# Patient Record
Sex: FEMALE | Race: WHITE | NOT HISPANIC OR LATINO | Employment: FULL TIME | RURAL
[De-identification: names, ages, dates, MRNs, and addresses within clinical notes are randomized per-mention and may not be internally consistent; named-entity substitution may affect disease eponyms.]

---

## 2020-01-17 ENCOUNTER — OFFICE VISIT (OUTPATIENT)
Dept: FAMILY MEDICINE CLINIC | Facility: CLINIC | Age: 30
End: 2020-01-17

## 2020-01-17 ENCOUNTER — APPOINTMENT (OUTPATIENT)
Dept: LAB | Facility: HOSPITAL | Age: 30
End: 2020-01-17

## 2020-01-17 VITALS
OXYGEN SATURATION: 96 % | DIASTOLIC BLOOD PRESSURE: 90 MMHG | HEART RATE: 64 BPM | TEMPERATURE: 98.2 F | BODY MASS INDEX: 37.42 KG/M2 | SYSTOLIC BLOOD PRESSURE: 128 MMHG | HEIGHT: 65 IN | WEIGHT: 224.6 LBS | RESPIRATION RATE: 20 BRPM

## 2020-01-17 DIAGNOSIS — Z13.29 SCREENING FOR THYROID DISORDER: ICD-10-CM

## 2020-01-17 DIAGNOSIS — Z00.00 WELL ADULT EXAM: Primary | ICD-10-CM

## 2020-01-17 DIAGNOSIS — Z13.1 SCREENING FOR DIABETES MELLITUS: ICD-10-CM

## 2020-01-17 DIAGNOSIS — E03.9 ACQUIRED HYPOTHYROIDISM: ICD-10-CM

## 2020-01-17 DIAGNOSIS — N92.0 MENORRHAGIA WITH REGULAR CYCLE: ICD-10-CM

## 2020-01-17 DIAGNOSIS — I10 ESSENTIAL HYPERTENSION: ICD-10-CM

## 2020-01-17 PROCEDURE — 82570 ASSAY OF URINE CREATININE: CPT | Performed by: NURSE PRACTITIONER

## 2020-01-17 PROCEDURE — 84481 FREE ASSAY (FT-3): CPT | Performed by: NURSE PRACTITIONER

## 2020-01-17 PROCEDURE — 99203 OFFICE O/P NEW LOW 30 MIN: CPT | Performed by: NURSE PRACTITIONER

## 2020-01-17 PROCEDURE — 82306 VITAMIN D 25 HYDROXY: CPT | Performed by: NURSE PRACTITIONER

## 2020-01-17 PROCEDURE — 83036 HEMOGLOBIN GLYCOSYLATED A1C: CPT | Performed by: NURSE PRACTITIONER

## 2020-01-17 PROCEDURE — 80050 GENERAL HEALTH PANEL: CPT | Performed by: NURSE PRACTITIONER

## 2020-01-17 PROCEDURE — 82043 UR ALBUMIN QUANTITATIVE: CPT | Performed by: NURSE PRACTITIONER

## 2020-01-17 PROCEDURE — 84439 ASSAY OF FREE THYROXINE: CPT | Performed by: NURSE PRACTITIONER

## 2020-01-17 RX ORDER — LISINOPRIL 5 MG/1
5 TABLET ORAL DAILY
Qty: 90 TABLET | Refills: 1 | Status: SHIPPED | OUTPATIENT
Start: 2020-01-17 | End: 2020-01-29 | Stop reason: SINTOL

## 2020-01-18 LAB
25(OH)D3 SERPL-MCNC: 18.7 NG/ML (ref 30–100)
ALBUMIN SERPL-MCNC: 4.5 G/DL (ref 3.5–5.2)
ALBUMIN UR-MCNC: <1.2 MG/DL
ALBUMIN/GLOB SERPL: 1.6 G/DL
ALP SERPL-CCNC: 57 U/L (ref 39–117)
ALT SERPL W P-5'-P-CCNC: 17 U/L (ref 1–33)
ANION GAP SERPL CALCULATED.3IONS-SCNC: 12.6 MMOL/L (ref 5–15)
AST SERPL-CCNC: 16 U/L (ref 1–32)
BASOPHILS # BLD AUTO: 0.06 10*3/MM3 (ref 0–0.2)
BASOPHILS NFR BLD AUTO: 0.7 % (ref 0–1.5)
BILIRUB SERPL-MCNC: 0.2 MG/DL (ref 0.2–1.2)
BUN BLD-MCNC: 12 MG/DL (ref 6–20)
BUN/CREAT SERPL: 17.9 (ref 7–25)
CALCIUM SPEC-SCNC: 10 MG/DL (ref 8.6–10.5)
CHLORIDE SERPL-SCNC: 98 MMOL/L (ref 98–107)
CO2 SERPL-SCNC: 26.4 MMOL/L (ref 22–29)
CREAT BLD-MCNC: 0.67 MG/DL (ref 0.57–1)
CREAT UR-MCNC: 34.1 MG/DL
DEPRECATED RDW RBC AUTO: 37.9 FL (ref 37–54)
EOSINOPHIL # BLD AUTO: 0.09 10*3/MM3 (ref 0–0.4)
EOSINOPHIL NFR BLD AUTO: 1 % (ref 0.3–6.2)
ERYTHROCYTE [DISTWIDTH] IN BLOOD BY AUTOMATED COUNT: 12.5 % (ref 12.3–15.4)
GFR SERPL CREATININE-BSD FRML MDRD: 104 ML/MIN/1.73
GLOBULIN UR ELPH-MCNC: 2.9 GM/DL
GLUCOSE BLD-MCNC: 85 MG/DL (ref 65–99)
HBA1C MFR BLD: 5.03 % (ref 4.8–5.6)
HCT VFR BLD AUTO: 41.1 % (ref 34–46.6)
HGB BLD-MCNC: 14.4 G/DL (ref 12–15.9)
IMM GRANULOCYTES # BLD AUTO: 0.02 10*3/MM3 (ref 0–0.05)
IMM GRANULOCYTES NFR BLD AUTO: 0.2 % (ref 0–0.5)
LYMPHOCYTES # BLD AUTO: 2.48 10*3/MM3 (ref 0.7–3.1)
LYMPHOCYTES NFR BLD AUTO: 28.5 % (ref 19.6–45.3)
MCH RBC QN AUTO: 29.1 PG (ref 26.6–33)
MCHC RBC AUTO-ENTMCNC: 35 G/DL (ref 31.5–35.7)
MCV RBC AUTO: 83.2 FL (ref 79–97)
MICROALBUMIN/CREAT UR: NORMAL MG/G{CREAT}
MONOCYTES # BLD AUTO: 0.44 10*3/MM3 (ref 0.1–0.9)
MONOCYTES NFR BLD AUTO: 5.1 % (ref 5–12)
NEUTROPHILS # BLD AUTO: 5.61 10*3/MM3 (ref 1.7–7)
NEUTROPHILS NFR BLD AUTO: 64.5 % (ref 42.7–76)
NRBC BLD AUTO-RTO: 0 /100 WBC (ref 0–0.2)
PLATELET # BLD AUTO: 260 10*3/MM3 (ref 140–450)
PMV BLD AUTO: 11.3 FL (ref 6–12)
POTASSIUM BLD-SCNC: 4.4 MMOL/L (ref 3.5–5.2)
PROT SERPL-MCNC: 7.4 G/DL (ref 6–8.5)
RBC # BLD AUTO: 4.94 10*6/MM3 (ref 3.77–5.28)
SODIUM BLD-SCNC: 137 MMOL/L (ref 136–145)
T3FREE SERPL-MCNC: 2.77 PG/ML (ref 2–4.4)
T4 FREE SERPL-MCNC: 1.03 NG/DL (ref 0.93–1.7)
TSH SERPL DL<=0.05 MIU/L-ACNC: 2.06 UIU/ML (ref 0.27–4.2)
WBC NRBC COR # BLD: 8.7 10*3/MM3 (ref 3.4–10.8)

## 2020-01-27 ENCOUNTER — TELEPHONE (OUTPATIENT)
Dept: FAMILY MEDICINE CLINIC | Facility: CLINIC | Age: 30
End: 2020-01-27

## 2020-01-27 NOTE — TELEPHONE ENCOUNTER
----- Message from LAWANDA Pierce sent at 1/22/2020  1:18 PM CST -----  Vitamin D is very low.  Recommend OTC vitamin D3 supplement once daily.  Otherwise normal labs.

## 2020-01-29 ENCOUNTER — OFFICE VISIT (OUTPATIENT)
Dept: FAMILY MEDICINE CLINIC | Facility: CLINIC | Age: 30
End: 2020-01-29

## 2020-01-29 VITALS
SYSTOLIC BLOOD PRESSURE: 116 MMHG | HEIGHT: 66 IN | BODY MASS INDEX: 35.86 KG/M2 | TEMPERATURE: 98.4 F | OXYGEN SATURATION: 98 % | RESPIRATION RATE: 20 BRPM | DIASTOLIC BLOOD PRESSURE: 78 MMHG | WEIGHT: 223.13 LBS | HEART RATE: 75 BPM

## 2020-01-29 DIAGNOSIS — H11.32 SUBCONJUNCTIVAL HEMORRHAGE OF LEFT EYE: ICD-10-CM

## 2020-01-29 DIAGNOSIS — R05.8 COUGH DUE TO ACE INHIBITOR: Primary | ICD-10-CM

## 2020-01-29 DIAGNOSIS — T46.4X5A COUGH DUE TO ACE INHIBITOR: Primary | ICD-10-CM

## 2020-01-29 DIAGNOSIS — Z20.828 EXPOSURE TO INFLUENZA: ICD-10-CM

## 2020-01-29 LAB
EXPIRATION DATE: NORMAL
FLUAV AG NPH QL: NEGATIVE
FLUBV AG NPH QL: NEGATIVE
INTERNAL CONTROL: NORMAL
Lab: NORMAL

## 2020-01-29 PROCEDURE — 99214 OFFICE O/P EST MOD 30 MIN: CPT | Performed by: NURSE PRACTITIONER

## 2020-01-29 PROCEDURE — 87804 INFLUENZA ASSAY W/OPTIC: CPT | Performed by: NURSE PRACTITIONER

## 2020-01-29 NOTE — PROGRESS NOTES
"Subjective   Kecia Julien is a 29 y.o. female.     [facility] Visit    PCP: LAWANDA Tatum    CC: \"persistent cough, headache from cough\"    Recently started on Lisinopril on 1- for HTN.  Had previously been on beta blocker and b/p was running too low.  Currently on Lisinopril 5 mg daily.  Cough started about a week after she started taking medication.     Living at [facility] and one of the ladies tested + for influenza this week.      Cough   This is a new problem. Episode onset: x 4-5 days. The problem has been unchanged. The problem occurs every few minutes. The cough is non-productive. Associated symptoms include eye redness ( right) and headaches (only with cough). Pertinent negatives include no chest pain, chills, ear congestion, ear pain, fever, heartburn, hemoptysis, myalgias, nasal congestion, postnasal drip, rash, rhinorrhea, sore throat, shortness of breath, sweats, weight loss or wheezing. Nothing (started after taking Lisinopril x 1 week) aggravates the symptoms. Risk factors: recent exposure to Influenza. She has tried nothing for the symptoms.        The following portions of the patient's history were reviewed and updated as appropriate: allergies, current medications, past medical history, past social history, past surgical history and problem list.    Review of Systems   Constitutional: Negative.  Negative for chills, fever and unexpected weight loss.   HENT: Negative for ear pain, postnasal drip, rhinorrhea and sore throat.    Eyes: Positive for redness ( right).   Respiratory: Positive for cough. Negative for hemoptysis, chest tightness, shortness of breath and wheezing.    Cardiovascular: Negative for chest pain.   Gastrointestinal: Negative for diarrhea and nausea.   Genitourinary: Negative for difficulty urinating.   Musculoskeletal: Negative for myalgias.   Skin: Negative for rash.   Neurological: Positive for headache (with cough only). Negative for dizziness.     /78 " "(BP Location: Left arm, Patient Position: Sitting, Cuff Size: Adult)   Pulse 75   Temp 98.4 °F (36.9 °C) (Oral)   Resp 20   Ht 167.6 cm (66\")   Wt 101 kg (223 lb 2 oz)   LMP 01/03/2020   SpO2 98%   Breastfeeding No   BMI 36.01 kg/m²     Objective   Physical Exam   Constitutional: She is oriented to person, place, and time. She appears well-developed and well-nourished. No distress.   HENT:   Head: Normocephalic and atraumatic.   Right Ear: Tympanic membrane and ear canal normal.   Left Ear: Tympanic membrane and ear canal normal.   Nose: Nose normal. Right sinus exhibits no maxillary sinus tenderness and no frontal sinus tenderness. Left sinus exhibits no maxillary sinus tenderness and no frontal sinus tenderness.   Mouth/Throat: Uvula is midline, oropharynx is clear and moist and mucous membranes are normal.   Eyes: Right eye exhibits no discharge. Left eye exhibits no discharge. Right conjunctiva has a hemorrhage ( small, upper medial aspect). Left conjunctiva has no hemorrhage.   Neck: Neck supple.   Cardiovascular: Normal rate and regular rhythm.   Pulmonary/Chest: Effort normal and breath sounds normal. She has no wheezes. She has no rales.   Frequent, hacky cough   Lymphadenopathy:     She has no cervical adenopathy.   Neurological: She is alert and oriented to person, place, and time.   Nursing note and vitals reviewed.    Recent Results (from the past 24 hour(s))   POC Influenza A / B    Collection Time: 01/29/20  2:21 PM   Result Value Ref Range    Rapid Influenza A Ag Negative Negative    Rapid Influenza B Ag Negative Negative    Internal Control Passed Passed    Lot Number [id number]     Expiration Date 03-      No Images in the past 120 days found..      Assessment/Plan   Kecia was seen today for cough.    Diagnoses and all orders for this visit:    Cough due to ACE inhibitor  -     POC Influenza A / B    Exposure to influenza  -     POC Influenza A / B    Subconjunctival hemorrhage of " left eye  Comments:  from persistent cough      Suspect cough from recent initiation of Ace inhibitor--will list as allergy and discontinue  Wishes to hold off on further treatment of HTN at this time.  Will monitor at home for the next week or two and notify us if she is seeing an elevation and then we can start Losartan 25 mg daily.    See PCP for routine f/u and management of HTN    Reassurance that subconjunctival hemorrhage should resolve on its own.      Patient's Body mass index is 36.01 kg/m². BMI is above normal parameters. Recommendations include: referral to primary care.    See PCP or RTC if symptoms persist/worsen  See PCP for routine f/u visit and management of chronic medical conditions      This document has been electronically signed by LAWANDA Bazan on January 29, 2020 2:23 PM,.

## 2020-02-10 ENCOUNTER — OFFICE VISIT (OUTPATIENT)
Dept: OBSTETRICS AND GYNECOLOGY | Facility: CLINIC | Age: 30
End: 2020-02-10

## 2020-02-10 VITALS
WEIGHT: 224 LBS | HEIGHT: 66 IN | SYSTOLIC BLOOD PRESSURE: 116 MMHG | DIASTOLIC BLOOD PRESSURE: 80 MMHG | BODY MASS INDEX: 36 KG/M2

## 2020-02-10 DIAGNOSIS — N92.0 MENORRHAGIA WITH REGULAR CYCLE: Primary | ICD-10-CM

## 2020-02-10 PROCEDURE — 99212 OFFICE O/P EST SF 10 MIN: CPT | Performed by: NURSE PRACTITIONER

## 2020-02-10 NOTE — PROGRESS NOTES
Subjective   Kecia Julien is a 29 y.o. here for heavy menses    LMP: 2/7/2020  [facility], per patient. Signed release.  BC: Tubal ligation, 2016      Pt desires treatment for her heavy menstrual cycles. She reports her periods occur monthly but last for two weeks at a time with a heavy flow. She is not interested in hormonal therapy. She had a Mirena in 2015 and it did not help with her period. She also became pregnant with the Mirena which resulted in a miscarriage. Pt also has a hx of high blood pressure; not currently taking meds. Recently discontinued lisinopril.     Menstrual Problem   This is a chronic problem. The current episode started more than 1 year ago. The problem occurs every several days. The problem has been unchanged. Pertinent negatives include no abdominal pain, chest pain, chills, fatigue, fever, nausea, rash, sore throat, vomiting or weakness. Nothing aggravates the symptoms. She has tried nothing for the symptoms.       The following portions of the patient's history were reviewed and updated as appropriate: allergies, current medications, past family history, past medical history, past social history, past surgical history and problem list.    Review of Systems   Constitutional: Negative for chills, fatigue, fever, unexpected weight gain and unexpected weight loss.   HENT: Negative for sneezing and sore throat.    Respiratory: Negative for shortness of breath.    Cardiovascular: Negative for chest pain and palpitations.   Gastrointestinal: Negative for abdominal pain, constipation, diarrhea, nausea and vomiting.   Endocrine: Negative for cold intolerance and heat intolerance.   Genitourinary: Positive for menstrual problem. Negative for amenorrhea, breast discharge, breast lump, breast pain, difficulty urinating, dysuria, frequency, pelvic pain, pelvic pressure, urinary incontinence, vaginal bleeding, vaginal discharge and vaginal pain.   Skin: Negative for  rash.   Neurological: Negative for weakness and headache.   Psychiatric/Behavioral: Negative for sleep disturbance, depressed mood and stress.       Objective   Physical Exam   Constitutional: She is oriented to person, place, and time. She appears well-developed and well-nourished.   HENT:   Head: Normocephalic.   Neck: Normal range of motion.   Pulmonary/Chest: Effort normal.   Abdominal: Soft.   Musculoskeletal: Normal range of motion.   Neurological: She is alert and oriented to person, place, and time.   Skin: Skin is warm and dry.   Psychiatric: She has a normal mood and affect. Her behavior is normal.   Nursing note and vitals reviewed.        Assessment/Plan   Diagnoses and all orders for this visit:    Menorrhagia with regular cycle      Counseled on hormonal options to manage heavy menses to include OCPs & Depo-provera; pt is not interested. Discussed possible endometrial ablation; [employer] handout reviewed and given to patient. Pt to schedule an apt with Dr. Bowles for a consult.

## 2020-02-11 DIAGNOSIS — N92.0 MENORRHAGIA WITH REGULAR CYCLE: Primary | ICD-10-CM

## 2020-02-12 ENCOUNTER — TELEPHONE (OUTPATIENT)
Dept: OBSTETRICS AND GYNECOLOGY | Facility: CLINIC | Age: 30
End: 2020-02-12

## 2020-02-12 NOTE — TELEPHONE ENCOUNTER
----- Message from LAWANDA Sheikh sent at 2/11/2020  4:36 PM CST -----  Can we call patient and have her do an U/S before she meets with Dr. Bowles to rule out fibroids before she can proceeds with an endometrial ablation? I have already place an order. Per Dr. Bowles, she will also need an EMB in which Dr. Bowles can do it when he sees her.

## 2020-02-12 NOTE — TELEPHONE ENCOUNTER
"Attempted to call patient at this time message stated \"This is Sheila\"  " Detail Level: Detailed Post-Care Instructions: I reviewed with the patient in detail post-care instructions. Patient is to wear sunprotection, and avoid picking at any of the treated lesions. Pt may apply Vaseline to crusted or scabbing areas. Medical Necessity Information: It is in your best interest to select a reason for this procedure from the list below. All of these items fulfill various CMS LCD requirements except the new and changing color options. Medical Necessity Clause: This procedure was medically necessary because the lesions that were treated were: bleeding and irritated Include Z78.9 (Other Specified Conditions Influencing Health Status) As An Associated Diagnosis?: Yes Consent: The patient's consent was obtained including but not limited to risks of crusting, scabbing, blistering, scarring, darker or lighter pigmentary change, recurrence, incomplete removal and infection. Duration Of Freeze Thaw-Cycle (Seconds): 0 Number Of Freeze-Thaw Cycles: 2 freeze-thaw cycles Add 52 Modifier (Optional): no

## 2020-02-17 ENCOUNTER — TELEPHONE (OUTPATIENT)
Dept: OBSTETRICS AND GYNECOLOGY | Facility: CLINIC | Age: 30
End: 2020-02-17

## 2020-02-17 NOTE — TELEPHONE ENCOUNTER
Attempted to get in touch with patient again at this time patient was not available left message for patient to call back

## 2020-02-20 ENCOUNTER — OFFICE VISIT (OUTPATIENT)
Dept: OBSTETRICS AND GYNECOLOGY | Facility: CLINIC | Age: 30
End: 2020-02-20

## 2020-02-20 VITALS
DIASTOLIC BLOOD PRESSURE: 70 MMHG | SYSTOLIC BLOOD PRESSURE: 119 MMHG | WEIGHT: 226 LBS | HEIGHT: 66 IN | BODY MASS INDEX: 36.32 KG/M2

## 2020-02-20 DIAGNOSIS — N92.0 MENORRHAGIA WITH REGULAR CYCLE: Primary | ICD-10-CM

## 2020-02-20 PROCEDURE — 99213 OFFICE O/P EST LOW 20 MIN: CPT | Performed by: OBSTETRICS & GYNECOLOGY

## 2020-02-20 NOTE — PROGRESS NOTES
Kecia Julien is a 29 y.o. y/o female.     Chief Complaint: Heavy periods    HPI:   29 y.o. .  Patient's last menstrual period was 2020..  Patient presents for follow-up with me today for heavy periods.  Was seen by Liset last week for evaluation and was sent to me for counseling regarding endometrial ablation.  Patient states that she has had very heavy periods that last for several days to weeks at this time.  Has had a Mirena in the past which did not help her periods at all, she is now looking for more definitive management of her bleeding as it is becoming life altering.  Patient has had 3 previous C-sections, I explained to her that given the C-sections she may be at slightly increased risk of damage during the ablation is that portion of the uterus has thinned and can lead to perforation.  Patient is going to get a pelvic ultrasound to see if there is any other further causes of her bleeding that may be able to be found.  After ultrasound is done we will review results and will need to do an endometrial biopsy based on these findings will discuss with patient if hysterectomy versus ablation may be a better option for her.  I spent 50% of this appointment discussing treatment options risks and benefits with this patient.     Review of Systems   Constitutional: Negative for chills, fatigue and fever.   HENT: Negative for sore throat.    Eyes: Negative for visual disturbance.   Respiratory: Negative for cough, shortness of breath and wheezing.    Cardiovascular: Negative for chest pain, palpitations and leg swelling.   Gastrointestinal: Negative for abdominal pain, diarrhea, nausea and vomiting.   Genitourinary: Positive for menstrual problem and vaginal bleeding. Negative for dysuria, flank pain, frequency, vaginal discharge and vaginal pain.   Neurological: Negative for syncope, light-headedness and headaches.   Psychiatric/Behavioral: Negative for dysphoric mood and suicidal ideas. The patient is  not nervous/anxious.         The following portions of the patient's history were reviewed and updated as appropriate: allergies, current medications, past family history, past medical history, past social history, past surgical history and problem list.    Allergies   Allergen Reactions   • Lisinopril Cough        Prior to Admission medications    Not on File        The patient has a family history of   Family History   Problem Relation Age of Onset   • Hypertension Mother    • Diabetes Father    • Cancer Maternal Aunt    • Ovarian cancer Maternal Aunt    • Hypertension Maternal Aunt    • Atrial fibrillation Maternal Grandfather    • Hypertension Maternal Grandfather    • Cancer Paternal Grandmother    • Stroke Maternal Grandmother    • Stroke Maternal Aunt    • Hypertension Maternal Aunt    • Diabetes Paternal Grandfather         Past Medical History:   Diagnosis Date   • Hypertension    • Hypothyroid 2016   • Hypothyroidism    • Kidney stone 2016        OB History        4    Para   3    Term   3            AB   1    Living   3       SAB   1    TAB   0    Ectopic   0    Molar   0    Multiple   0    Live Births   3                 Social History     Socioeconomic History   • Marital status:      Spouse name: Not on file   • Number of children: Not on file   • Years of education: Not on file   • Highest education level: Not on file   Tobacco Use   • Smoking status: Former Smoker     Packs/day: 1.00     Years: 10.00     Pack years: 10.00     Types: Cigarettes     Start date: 2009     Last attempt to quit: 2019     Years since quittin.1   • Smokeless tobacco: Never Used   Substance and Sexual Activity   • Alcohol use: Not Currently     Frequency: Never   • Drug use: Not Currently     Types: Methamphetamines   • Sexual activity: Not Currently     Partners: Male     Birth control/protection: Surgical        Past Surgical History:   Procedure Laterality Date   •   "SECTION     •  SECTION WITH TUBAL  2016   • CHOLECYSTECTOMY     • D&C WITH SUCTION  2015   • LAPAROSCOPIC CHOLECYSTECTOMY  2016   • TONSILLECTOMY     • TUBAL ABDOMINAL LIGATION          Patient Active Problem List   Diagnosis   • Cough due to ACE inhibitor        Documented Vitals    20 1120   BP: 119/70   Weight: 103 kg (226 lb)   Height: 167.6 cm (66\")   PainSc: 0-No pain        Body mass index is 36.48 kg/m².    Physical Exam   Constitutional: She is oriented to person, place, and time. She appears well-developed and well-nourished. No distress.   Musculoskeletal: Normal range of motion.   Neurological: She is alert and oriented to person, place, and time.   Skin: Skin is warm and dry. She is not diaphoretic.   Psychiatric: She has a normal mood and affect. Her behavior is normal. Judgment and thought content normal.   Nursing note and vitals reviewed.      Laboratory Data:   Lab Results - Last 18 Months   Lab Units 20  1512   GLUCOSE mg/dL 85   BUN mg/dL 12   CREATININE mg/dL 0.67   SODIUM mmol/L 137   POTASSIUM mmol/L 4.4   CHLORIDE mmol/L 98   CO2 mmol/L 26.4   CALCIUM mg/dL 10.0   TOTAL PROTEIN g/dL 7.4   ALBUMIN g/dL 4.50   ALT (SGPT) U/L 17   AST (SGOT) U/L 16   ALK PHOS U/L 57   BILIRUBIN mg/dL 0.2   EGFR IF NONAFRICN AM mL/min/1.73 104   GLOBULIN gm/dL 2.9   A/G RATIO g/dL 1.6   BUN / CREAT RATIO  17.9   ANION GAP mmol/L 12.6     Lab Results - Last 18 Months   Lab Units 20  1512   WBC 10*3/mm3 8.70   RBC 10*6/mm3 4.94   HEMOGLOBIN g/dL 14.4   HEMATOCRIT % 41.1   MCV fL 83.2   MCH pg 29.1   MCHC g/dL 35.0   RDW % 12.5   RDW-SD fl 37.9   MPV fL 11.3   PLATELETS 10*3/mm3 260     No results for input(s): HCGQUAL in the last 08027 hours.    Assessment   Patient with abnormal uterine bleeding that is refractory to medical management having failed IUD.  Now desiring definitive treatment with surgical treatment.  Plan for patient to get pelvic ultrasound to determine if " there are any anatomic problems that be could be causing bleeding and may make her not a candidate for ablation.  Given patient's history of 3 previous  will decide if she is a candidate for ablation or not.  Patient to get pelvic ultrasound and follow-up with me in approximately 2 weeks.  Patient did not feel comfortable with endometrial biopsy today we will plan to do that in 2 weeks when she returns to see me.  Patient to return sooner as needed.     Diagnosis Plan   1. Menorrhagia with regular cycle           This document has been electronically signed by Vincenzo Bowles DO on 2020 11:38 AM

## 2020-02-26 ENCOUNTER — OFFICE VISIT (OUTPATIENT)
Dept: FAMILY MEDICINE CLINIC | Facility: CLINIC | Age: 30
End: 2020-02-26

## 2020-02-26 VITALS
HEIGHT: 66 IN | BODY MASS INDEX: 36.53 KG/M2 | SYSTOLIC BLOOD PRESSURE: 120 MMHG | HEART RATE: 82 BPM | RESPIRATION RATE: 20 BRPM | WEIGHT: 227.3 LBS | DIASTOLIC BLOOD PRESSURE: 60 MMHG | OXYGEN SATURATION: 98 % | TEMPERATURE: 98.7 F

## 2020-02-26 DIAGNOSIS — F43.0 STRESS REACTION: ICD-10-CM

## 2020-02-26 DIAGNOSIS — G47.09 OTHER INSOMNIA: Primary | ICD-10-CM

## 2020-02-26 PROCEDURE — 99214 OFFICE O/P EST MOD 30 MIN: CPT | Performed by: NURSE PRACTITIONER

## 2020-02-26 RX ORDER — AMITRIPTYLINE HYDROCHLORIDE 10 MG/1
10-20 TABLET, FILM COATED ORAL NIGHTLY PRN
Qty: 60 TABLET | Refills: 1 | Status: SHIPPED | OUTPATIENT
Start: 2020-02-26 | End: 2020-04-08 | Stop reason: DRUGHIGH

## 2020-02-26 NOTE — PROGRESS NOTES
Subjective   Kecia Julien is a 29 y.o. female.     She presents today to [facility] care.  She is a resident at [facility].  She reports a medical history significant for hypertension and thyroid disorder.  She reports that she has not been on thyroid medication in the last year.  She would like to have her levels checked today in the office.  She is due for routine labs.  Her blood pressure is slightly elevated today in the office.  She is otherwise without any other new complaints.  She reports that she is adapting well at [facility] and enjoying the program.    Hypertension   This is a chronic problem. The current episode started more than 1 year ago. The problem has been waxing and waning since onset. The problem is controlled. Associated symptoms include malaise/fatigue. Pertinent negatives include no anxiety, blurred vision, chest pain, headaches, neck pain, orthopnea, palpitations, peripheral edema, PND, shortness of breath or sweats. There are no associated agents to hypertension. Risk factors for coronary artery disease include family history, obesity and stress. Current antihypertension treatment includes nothing. The current treatment provides moderate improvement. Compliance problems include diet and exercise.  Identifiable causes of hypertension include a thyroid problem.   Thyroid Problem   Presents for initial visit. Symptoms include fatigue and menstrual problem. Patient reports no anxiety, cold intolerance, constipation, depressed mood, diaphoresis, diarrhea, dry skin, hair loss, heat intolerance, hoarse voice, leg swelling, nail problem, palpitations, tremors, visual change, weight gain or weight loss. The symptoms have been stable. Past treatments include nothing. Her past medical history is significant for obesity.        The following portions of the patient's history were reviewed and updated as appropriate: allergies, current medications, past family history, past medical history,  past social history, past surgical history and problem list.    Review of Systems   Constitutional: Positive for fatigue and malaise/fatigue. Negative for diaphoresis, unexpected weight gain and unexpected weight loss.   HENT: Negative.  Negative for hoarse voice.    Eyes: Negative.  Negative for blurred vision.   Respiratory: Negative.  Negative for shortness of breath.    Cardiovascular: Negative.  Negative for chest pain, palpitations, orthopnea and PND.   Gastrointestinal: Negative.  Negative for constipation and diarrhea.   Endocrine: Negative for cold intolerance and heat intolerance.   Genitourinary: Positive for menstrual problem.   Musculoskeletal: Negative.  Negative for neck pain.   Skin: Negative.  Negative for dry skin.   Allergic/Immunologic: Negative.    Neurological: Negative.  Negative for tremors.   Hematological: Negative.    Psychiatric/Behavioral: Negative.  Negative for depressed mood. The patient is not nervous/anxious.        Objective   Physical Exam   Constitutional: She is oriented to person, place, and time. Vital signs are normal. She appears well-developed and well-nourished. No distress. She is obese.  HENT:   Head: Normocephalic.   Right Ear: External ear normal.   Left Ear: External ear normal.   Nose: Nose normal.   Mouth/Throat: Oropharynx is clear and moist. No oropharyngeal exudate.   Eyes: Pupils are equal, round, and reactive to light. Conjunctivae and EOM are normal. Right eye exhibits no discharge. Left eye exhibits no discharge.   Neck: Normal range of motion. Neck supple. No tracheal deviation present. No thyromegaly present.   Cardiovascular: Normal rate, regular rhythm and normal heart sounds. Exam reveals no gallop and no friction rub.   No murmur heard.  Pulmonary/Chest: Effort normal and breath sounds normal. No respiratory distress. She has no wheezes. She has no rales. She exhibits no tenderness.   Musculoskeletal: Normal range of motion.   Lymphadenopathy:     She  has no cervical adenopathy.   Neurological: She is alert and oriented to person, place, and time.   Skin: Skin is warm and dry. Capillary refill takes less than 2 seconds. No rash noted. She is not diaphoretic. No erythema. No pallor.   Psychiatric: She has a normal mood and affect. Her behavior is normal. Judgment and thought content normal.   Nursing note and vitals reviewed.        Assessment/Plan   Kecia was seen today for establish care and hypertension.    Diagnoses and all orders for this visit:    Well adult exam  -     CBC & Differential  -     Comprehensive Metabolic Panel  -     Hemoglobin A1c  -     TSH  -     Vitamin D 25 Hydroxy  -     Microalbumin / Creatinine Urine Ratio - Urine, Clean Catch  -     T3, Free  -     T4, Free  -     CBC Auto Differential    Screening for diabetes mellitus  -     CBC & Differential  -     Comprehensive Metabolic Panel  -     Hemoglobin A1c  -     TSH  -     Vitamin D 25 Hydroxy  -     Microalbumin / Creatinine Urine Ratio - Urine, Clean Catch  -     T3, Free  -     T4, Free  -     CBC Auto Differential    Screening for thyroid disorder  -     CBC & Differential  -     Comprehensive Metabolic Panel  -     Hemoglobin A1c  -     TSH  -     Vitamin D 25 Hydroxy  -     Microalbumin / Creatinine Urine Ratio - Urine, Clean Catch  -     T3, Free  -     T4, Free  -     CBC Auto Differential    Acquired hypothyroidism  -     CBC & Differential  -     Comprehensive Metabolic Panel  -     Hemoglobin A1c  -     TSH  -     Vitamin D 25 Hydroxy  -     Microalbumin / Creatinine Urine Ratio - Urine, Clean Catch  -     T3, Free  -     T4, Free  -     CBC Auto Differential    Essential hypertension  -     CBC & Differential  -     Comprehensive Metabolic Panel  -     Hemoglobin A1c  -     TSH  -     Vitamin D 25 Hydroxy  -     Microalbumin / Creatinine Urine Ratio - Urine, Clean Catch  -     T3, Free  -     T4, Free  -     Discontinue: lisinopril (PRINIVIL,ZESTRIL) 5 MG tablet; Take 1  tablet by mouth Daily.  -     CBC Auto Differential    Menorrhagia with regular cycle  -     Ambulatory Referral to Obstetrics / Gynecology               Patient's Body mass index is 37.38 kg/m². BMI is above normal parameters. Recommendations include: educational material.    Lab following office visit.  Referral to GYN.  Continue current medications.  Follow up in 6 weeks for routine follow up.  Follow up sooner for problems/concerns.  Patient verbalized understanding and agreement with plan of care.        This document has been electronically signed by LAWANDA Pierce on February 26, 2020 8:52 AM

## 2020-03-17 NOTE — PROGRESS NOTES
Subjective   Kecia Julien is a 29 y.o. female.     She presents today for her routine follow-up on chronic medical problems.  Her blood pressure is normal today in the office.  She reports that she developed a cough after starting the lisinopril.  She has discontinued this.  She also was surprised to find out that her thyroid levels were normal.  She does have concerns with anxiety and stress symptoms.  She also has concerns with insomnia.  She is interested in something to help her rest at night if possible.  She is otherwise without any other new complaints today in the office.    Hypertension   This is a chronic problem. The current episode started more than 1 year ago. The problem has been waxing and waning since onset. The problem is controlled. Pertinent negatives include no anxiety, blurred vision, chest pain, headaches, neck pain, orthopnea, palpitations, peripheral edema, PND, shortness of breath or sweats. There are no associated agents to hypertension. Risk factors for coronary artery disease include family history, obesity and stress. Current antihypertension treatment includes nothing. The current treatment provides moderate improvement. Compliance problems include diet and exercise.  Identifiable causes of hypertension include a thyroid problem.   Insomnia   This is a new problem. The current episode started more than 1 month ago. The problem occurs intermittently. The problem has been gradually worsening. Associated symptoms include fatigue. Pertinent negatives include no abdominal pain, anorexia, arthralgias, change in bowel habit, chest pain, chills, congestion, coughing, diaphoresis, fever, headaches, joint swelling, myalgias, nausea, neck pain, numbness, rash, sore throat, swollen glands, urinary symptoms, vertigo, visual change, vomiting or weakness. The treatment provided no relief.        The following portions of the patient's history were reviewed and updated as appropriate: allergies,  current medications, past family history, past medical history, past social history, past surgical history and problem list.    Review of Systems   Constitutional: Positive for fatigue. Negative for chills, diaphoresis, fever, unexpected weight gain and unexpected weight loss.   HENT: Negative.  Negative for congestion, hoarse voice, sore throat and swollen glands.    Eyes: Negative.  Negative for blurred vision.   Respiratory: Negative.  Negative for cough and shortness of breath.    Cardiovascular: Negative.  Negative for chest pain, palpitations, orthopnea and PND.   Gastrointestinal: Negative.  Negative for abdominal pain, anorexia, change in bowel habit, constipation, diarrhea, nausea and vomiting.   Endocrine: Negative for cold intolerance and heat intolerance.   Genitourinary: Positive for menstrual problem.   Musculoskeletal: Negative.  Negative for arthralgias, joint swelling, myalgias and neck pain.   Skin: Negative.  Negative for rash.   Allergic/Immunologic: Negative.    Neurological: Negative.  Negative for vertigo, tremors, weakness and numbness.   Hematological: Negative.    Psychiatric/Behavioral: Positive for sleep disturbance and stress. Negative for depressed mood. The patient has insomnia. The patient is not nervous/anxious.        Objective   Physical Exam   Constitutional: She is oriented to person, place, and time. Vital signs are normal. She appears well-developed and well-nourished. No distress. She is obese.  HENT:   Head: Normocephalic.   Right Ear: External ear normal.   Left Ear: External ear normal.   Nose: Nose normal.   Mouth/Throat: Oropharynx is clear and moist. No oropharyngeal exudate.   Eyes: Pupils are equal, round, and reactive to light. Conjunctivae and EOM are normal. Right eye exhibits no discharge. Left eye exhibits no discharge.   Neck: Normal range of motion. Neck supple. No tracheal deviation present. No thyromegaly present.   Cardiovascular: Normal rate, regular rhythm  and normal heart sounds. Exam reveals no gallop and no friction rub.   No murmur heard.  Pulmonary/Chest: Effort normal and breath sounds normal. No respiratory distress. She has no wheezes. She has no rales. She exhibits no tenderness.   Musculoskeletal: Normal range of motion.   Lymphadenopathy:     She has no cervical adenopathy.   Neurological: She is alert and oriented to person, place, and time.   Skin: Skin is warm and dry. Capillary refill takes less than 2 seconds. No rash noted. She is not diaphoretic. No erythema. No pallor.   Psychiatric: She has a normal mood and affect. Her behavior is normal. Judgment and thought content normal.   Nursing note and vitals reviewed.        Assessment/Plan   Kecia was seen today for follow-up.    Diagnoses and all orders for this visit:    Other insomnia  -     amitriptyline (ELAVIL) 10 MG tablet; Take 1-2 tablets by mouth At Night As Needed for Sleep.    Stress reaction  -     amitriptyline (ELAVIL) 10 MG tablet; Take 1-2 tablets by mouth At Night As Needed for Sleep.               Patient's Body mass index is 36.69 kg/m². BMI is above normal parameters. Recommendations include: educational material.    Start on Elavil nightly as needed for insomnia symptoms.  Continue all other current medications.  Follow up in 3 months for routine follow up.  Follow up sooner for problems/concerns.  Patient verbalized understanding and agreement with plan of care.        This document has been electronically signed by LAWANDA Pierce on March 17, 2020 09:29

## 2020-03-19 ENCOUNTER — OFFICE VISIT (OUTPATIENT)
Dept: OBSTETRICS AND GYNECOLOGY | Facility: CLINIC | Age: 30
End: 2020-03-19

## 2020-03-19 ENCOUNTER — APPOINTMENT (OUTPATIENT)
Dept: LAB | Facility: HOSPITAL | Age: 30
End: 2020-03-19

## 2020-03-19 VITALS
BODY MASS INDEX: 36.16 KG/M2 | HEIGHT: 66 IN | SYSTOLIC BLOOD PRESSURE: 122 MMHG | WEIGHT: 225 LBS | DIASTOLIC BLOOD PRESSURE: 71 MMHG

## 2020-03-19 DIAGNOSIS — N92.0 MENORRHAGIA WITH REGULAR CYCLE: ICD-10-CM

## 2020-03-19 DIAGNOSIS — N93.9 ABNORMAL UTERINE BLEEDING (AUB): Primary | ICD-10-CM

## 2020-03-19 PROCEDURE — 88305 TISSUE EXAM BY PATHOLOGIST: CPT | Performed by: PATHOLOGY

## 2020-03-19 PROCEDURE — 58100 BIOPSY OF UTERUS LINING: CPT | Performed by: OBSTETRICS & GYNECOLOGY

## 2020-03-19 PROCEDURE — 88305 TISSUE EXAM BY PATHOLOGIST: CPT | Performed by: OBSTETRICS & GYNECOLOGY

## 2020-03-19 PROCEDURE — 99213 OFFICE O/P EST LOW 20 MIN: CPT | Performed by: OBSTETRICS & GYNECOLOGY

## 2020-03-19 RX ORDER — NORGESTIMATE AND ETHINYL ESTRADIOL 0.25-0.035
1 KIT ORAL DAILY
Qty: 1 PACKAGE | Refills: 12 | Status: SHIPPED | OUTPATIENT
Start: 2020-03-19 | End: 2020-06-08 | Stop reason: HOSPADM

## 2020-03-19 NOTE — PROGRESS NOTES
Kecia Julien is a 29 y.o. y/o female.     Chief Complaint: Follow-up on abnormal uterine bleeding    HPI:   29 y.o. .  Patient's last menstrual period was 2020..  Patient presents for follow-up on ultrasound today.  Reviewed ultrasound with patient which showed no abnormalities endometrial thickness was approximately 12 mm.  No fibroids seen.  Right ovary was normal in appearance left ovary was not seen.  Discussed with patient that unfortunately at this time all nonemergent surgeries are on hold and that we would have to schedule her ablation as soon as we are doing surgery again.  Patient expressed understanding.  Patient undergo endometrial biopsy today procedure was explained in detail to patient.     Review of Systems   Constitutional: Negative for chills, fatigue and fever.   HENT: Negative for sore throat.    Eyes: Negative for visual disturbance.   Respiratory: Negative for cough, shortness of breath and wheezing.    Cardiovascular: Negative for chest pain, palpitations and leg swelling.   Gastrointestinal: Negative for abdominal pain, diarrhea, nausea and vomiting.   Genitourinary: Positive for menstrual problem and vaginal bleeding. Negative for dysuria, flank pain, frequency, vaginal discharge and vaginal pain.   Neurological: Negative for syncope, light-headedness and headaches.   Psychiatric/Behavioral: Negative for dysphoric mood and suicidal ideas. The patient is not nervous/anxious.         The following portions of the patient's history were reviewed and updated as appropriate: allergies, current medications, past family history, past medical history, past social history, past surgical history and problem list.    Allergies   Allergen Reactions   • Lisinopril Cough        Prior to Admission medications    Medication Sig Start Date End Date Taking? Authorizing Provider   Acetaminophen (TYLENOL PO) Take 500 mg by mouth 2 (Two) Times a Day.   Yes Provider, MD Sanjana   amitriptyline  (ELAVIL) 10 MG tablet Take 1-2 tablets by mouth At Night As Needed for Sleep. 20  Yes Patricia Goss APRN   CBD (cannabidiol) oral oil Take 3 drops by mouth 2 (Two) Times a Day.   Yes Provider, MD Sanjana        The patient has a family history of   Family History   Problem Relation Age of Onset   • Hypertension Mother    • Diabetes Father    • Cancer Maternal Aunt    • Ovarian cancer Maternal Aunt    • Hypertension Maternal Aunt    • Atrial fibrillation Maternal Grandfather    • Hypertension Maternal Grandfather    • Cancer Paternal Grandmother    • Stroke Maternal Grandmother    • Stroke Maternal Aunt    • Hypertension Maternal Aunt    • Diabetes Paternal Grandfather         Past Medical History:   Diagnosis Date   • Hypertension    • Hypothyroid 2016   • Hypothyroidism    • Kidney stone 2016        OB History        4    Para   3    Term   3            AB   1    Living   3       SAB   1    TAB   0    Ectopic   0    Molar   0    Multiple   0    Live Births   3                 Social History     Socioeconomic History   • Marital status:      Spouse name: Not on file   • Number of children: Not on file   • Years of education: Not on file   • Highest education level: Not on file   Tobacco Use   • Smoking status: Former Smoker     Packs/day: 1.00     Years: 10.00     Pack years: 10.00     Types: Cigarettes     Start date: 2009     Last attempt to quit: 2019     Years since quittin.2   • Smokeless tobacco: Never Used   Substance and Sexual Activity   • Alcohol use: Not Currently     Frequency: Never   • Drug use: Not Currently     Types: Methamphetamines   • Sexual activity: Not Currently     Partners: Male     Birth control/protection: Surgical        Past Surgical History:   Procedure Laterality Date   •  SECTION     •  SECTION WITH TUBAL  2016   • CHOLECYSTECTOMY     • D&C WITH SUCTION  2015   • LAPAROSCOPIC CHOLECYSTECTOMY  2016  "  • TONSILLECTOMY     • TUBAL ABDOMINAL LIGATION          Patient Active Problem List   Diagnosis   • Cough due to ACE inhibitor        Documented Vitals    03/19/20 0849   BP: 122/71   Weight: 102 kg (225 lb)   Height: 167.6 cm (66\")   PainSc: 0-No pain        Body mass index is 36.32 kg/m².    Physical Exam   Constitutional: She is oriented to person, place, and time. She appears well-developed and well-nourished. No distress.   HENT:   Head: Normocephalic.   Genitourinary: Vagina normal.   Genitourinary Comments: Cervix was visualized with speculum, cleaned with Betadine.  Anterior lip was grasped with an Allis clamp.  Uterus sounded to approximately 9 cm, with moderate amount of tissue obtained.  Patient tolerated endometrial biopsy without difficulty.   Musculoskeletal: Normal range of motion.   Neurological: She is alert and oriented to person, place, and time.   Skin: Skin is warm and dry. She is not diaphoretic.   Psychiatric: She has a normal mood and affect. Her behavior is normal. Judgment and thought content normal.   Vitals reviewed.      Laboratory Data:   Lab Results - Last 18 Months   Lab Units 01/17/20  1512   GLUCOSE mg/dL 85   BUN mg/dL 12   CREATININE mg/dL 0.67   SODIUM mmol/L 137   POTASSIUM mmol/L 4.4   CHLORIDE mmol/L 98   CO2 mmol/L 26.4   CALCIUM mg/dL 10.0   TOTAL PROTEIN g/dL 7.4   ALBUMIN g/dL 4.50   ALT (SGPT) U/L 17   AST (SGOT) U/L 16   ALK PHOS U/L 57   BILIRUBIN mg/dL 0.2   EGFR IF NONAFRICN AM mL/min/1.73 104   GLOBULIN gm/dL 2.9   A/G RATIO g/dL 1.6   BUN / CREAT RATIO  17.9   ANION GAP mmol/L 12.6     Lab Results - Last 18 Months   Lab Units 01/17/20  1512   WBC 10*3/mm3 8.70   RBC 10*6/mm3 4.94   HEMOGLOBIN g/dL 14.4   HEMATOCRIT % 41.1   MCV fL 83.2   MCH pg 29.1   MCHC g/dL 35.0   RDW % 12.5   RDW-SD fl 37.9   MPV fL 11.3   PLATELETS 10*3/mm3 260     No results for input(s): HCGQUAL in the last 69092 hours.    Assessment   Doing well at this time.  Ultrasound results normal.  " Patient continues to have abnormal bleeding still desires ablation for treatment.  Endometrial biopsy done today without difficulty.  Plan to start patient on oral contraceptive pills to control bleeding as ablation cannot be done until elective surgeries are being done at the hospital again explained to patient that I anticipate this to be about 1 month but uncertain at this time.  Will place patient's name on the list to schedule for surgery  When allowed to do surgery again.   Diagnosis Plan   1. Abnormal uterine bleeding (AUB)  Tissue Pathology Exam    Tissue Pathology Exam   2. Menorrhagia with regular cycle           Plan         New Medications Ordered This Visit   Medications   • norgestimate-ethinyl estradiol (ORTHO-CYCLEN) 0.25-35 MG-MCG per tablet     Sig: Take 1 tablet by mouth Daily.     Dispense:  1 package     Refill:  12             This document has been electronically signed by Vincenzo Bowles DO on March 19, 2020 09:08

## 2020-03-23 LAB
LAB AP CASE REPORT: NORMAL
LAB AP CLINICAL INFORMATION: NORMAL
PATH REPORT.FINAL DX SPEC: NORMAL

## 2020-03-31 DIAGNOSIS — N92.0 MENORRHAGIA WITH REGULAR CYCLE: ICD-10-CM

## 2020-04-08 ENCOUNTER — OFFICE VISIT (OUTPATIENT)
Dept: FAMILY MEDICINE CLINIC | Facility: CLINIC | Age: 30
End: 2020-04-08

## 2020-04-08 VITALS — HEART RATE: 87 BPM | DIASTOLIC BLOOD PRESSURE: 83 MMHG | SYSTOLIC BLOOD PRESSURE: 139 MMHG

## 2020-04-08 DIAGNOSIS — R03.0 ELEVATED BLOOD PRESSURE READING: ICD-10-CM

## 2020-04-08 DIAGNOSIS — F51.01 PRIMARY INSOMNIA: Primary | ICD-10-CM

## 2020-04-08 PROCEDURE — 99212 OFFICE O/P EST SF 10 MIN: CPT | Performed by: NURSE PRACTITIONER

## 2020-04-08 RX ORDER — AMITRIPTYLINE HYDROCHLORIDE 25 MG/1
25-50 TABLET, FILM COATED ORAL NIGHTLY
Qty: 60 TABLET | Refills: 2 | Status: SHIPPED | OUTPATIENT
Start: 2020-04-08 | End: 2020-05-28 | Stop reason: SDUPTHER

## 2020-04-08 NOTE — PROGRESS NOTES
Start time: 0916
Cecum: 0920
TI intubation: no 
End time:0928 Subjective   Kecia Julien is a 29 y.o. female.     Telephone encounter conducted today for her routine follow-up on chronic medical problems.  Her blood pressure is slightly elevated today.  She reports that she just got back from the grocery store and things were hectic there.  She reports that she developed a cough after starting the lisinopril.  She has discontinued this.  She reports that she has been checking her blood pressure regularly and this has been normal for the most part.  She reports that the Elavil has helped with her anxiety and stress symptoms, but she has continued to have some difficulty going to sleep.  She reports that when she is able to get to sleep that she sleeps well and feels rested when she wakes up in the morning.  She has been taking some melatonin as well to help with her insomnia, but she reports it still takes her 1-2 hours to be able to fall sleep at night.  She is otherwise without any other new complaints today.  She is still working.  She is currently working at [employer].  She is also still a resident at [facility].    Hypertension   This is a chronic problem. The current episode started more than 1 year ago. The problem has been waxing and waning since onset. The problem is controlled. Pertinent negatives include no anxiety, blurred vision, chest pain, headaches, neck pain, orthopnea, palpitations, peripheral edema, PND, shortness of breath or sweats. There are no associated agents to hypertension. Risk factors for coronary artery disease include family history, obesity and stress. Current antihypertension treatment includes nothing. The current treatment provides moderate improvement. Compliance problems include diet and exercise.  Identifiable causes of hypertension include a thyroid problem.   Insomnia   This is a new problem. The current episode started more than 1 month ago. The problem occurs intermittently. The problem has been gradually worsening. Associated  symptoms include fatigue. Pertinent negatives include no abdominal pain, anorexia, arthralgias, change in bowel habit, chest pain, chills, congestion, coughing, diaphoresis, fever, headaches, joint swelling, myalgias, nausea, neck pain, numbness, rash, sore throat, swollen glands, urinary symptoms, vertigo, visual change, vomiting or weakness. The treatment provided no relief.        The following portions of the patient's history were reviewed and updated as appropriate: allergies, current medications, past family history, past medical history, past social history, past surgical history and problem list.    Review of Systems   Constitutional: Positive for fatigue. Negative for chills, diaphoresis, fever, unexpected weight gain and unexpected weight loss.   HENT: Negative.  Negative for congestion, sore throat and swollen glands.    Eyes: Negative.  Negative for blurred vision.   Respiratory: Negative.  Negative for cough and shortness of breath.    Cardiovascular: Negative.  Negative for chest pain, palpitations, orthopnea and PND.   Gastrointestinal: Negative.  Negative for abdominal pain, anorexia, change in bowel habit, constipation, diarrhea, nausea and vomiting.   Endocrine: Negative for cold intolerance and heat intolerance.   Genitourinary: Positive for menstrual problem.   Musculoskeletal: Negative.  Negative for arthralgias, joint swelling, myalgias and neck pain.   Skin: Negative.  Negative for rash.   Allergic/Immunologic: Negative.    Neurological: Negative.  Negative for vertigo, tremors, weakness and numbness.   Hematological: Negative.    Psychiatric/Behavioral: Positive for sleep disturbance and stress. Negative for depressed mood. The patient has insomnia. The patient is not nervous/anxious.        Objective   Physical Exam   Constitutional: She is oriented to person, place, and time.   Pulmonary/Chest: Effort normal.   Neurological: She is alert and oriented to person, place, and time.    Psychiatric: She has a normal mood and affect. Her behavior is normal. Judgment and thought content normal.         Assessment/Plan   Kecia was seen today for follow-up and insomnia.    Diagnoses and all orders for this visit:    Primary insomnia  -     amitriptyline (ELAVIL) 25 MG tablet; Take 1-2 tablets by mouth Every Night.    Elevated blood pressure reading                   This visit has been rescheduled as a phone visit to comply with patient safety concerns in accordance with CDC recommendations. Total time of discussion was 5 minutes.    Increase Elavil dosing to 25-50 mg nightly as needed for insomnia symptoms.  Continue all other current medications.  Continued follow up with GYN as scheduled.  Follow up in 6 weeks for routine follow up.  Follow up sooner for problems/concerns.  Patient verbalized understanding and agreement with plan of care.        This document has been electronically signed by LAWANDA Pierce on April 8, 2020 09:18

## 2020-04-08 NOTE — PATIENT INSTRUCTIONS
Insomnia  Insomnia is a sleep disorder that makes it difficult to fall asleep or stay asleep. Insomnia can cause fatigue, low energy, difficulty concentrating, mood swings, and poor performance at work or school.  There are three different ways to classify insomnia:  · Difficulty falling asleep.  · Difficulty staying asleep.  · Waking up too early in the morning.  Any type of insomnia can be long-term (chronic) or short-term (acute). Both are common. Short-term insomnia usually lasts for three months or less. Chronic insomnia occurs at least three times a week for longer than three months.  What are the causes?  Insomnia may be caused by another condition, situation, or substance, such as:  · Anxiety.  · Certain medicines.  · Gastroesophageal reflux disease (GERD) or other gastrointestinal conditions.  · Asthma or other breathing conditions.  · Restless legs syndrome, sleep apnea, or other sleep disorders.  · Chronic pain.  · Menopause.  · Stroke.  · Abuse of alcohol, tobacco, or illegal drugs.  · Mental health conditions, such as depression.  · Caffeine.  · Neurological disorders, such as Alzheimer's disease.  · An overactive thyroid (hyperthyroidism).  Sometimes, the cause of insomnia may not be known.  What increases the risk?  Risk factors for insomnia include:  · Gender. Women are affected more often than men.  · Age. Insomnia is more common as you get older.  · Stress.  · Lack of exercise.  · Irregular work schedule or working night shifts.  · Traveling between different time zones.  · Certain medical and mental health conditions.  What are the signs or symptoms?  If you have insomnia, the main symptom is having trouble falling asleep or having trouble staying asleep. This may lead to other symptoms, such as:  · Feeling fatigued or having low energy.  · Feeling nervous about going to sleep.  · Not feeling rested in the morning.  · Having trouble concentrating.  · Feeling irritable, anxious, or depressed.  How  is this diagnosed?  This condition may be diagnosed based on:  · Your symptoms and medical history. Your health care provider may ask about:  ? Your sleep habits.  ? Any medical conditions you have.  ? Your mental health.  · A physical exam.  How is this treated?  Treatment for insomnia depends on the cause. Treatment may focus on treating an underlying condition that is causing insomnia. Treatment may also include:  · Medicines to help you sleep.  · Counseling or therapy.  · Lifestyle adjustments to help you sleep better.  Follow these instructions at home:  Eating and drinking    · Limit or avoid alcohol, caffeinated beverages, and cigarettes, especially close to bedtime. These can disrupt your sleep.  · Do not eat a large meal or eat spicy foods right before bedtime. This can lead to digestive discomfort that can make it hard for you to sleep.  Sleep habits    · Keep a sleep diary to help you and your health care provider figure out what could be causing your insomnia. Write down:  ? When you sleep.  ? When you wake up during the night.  ? How well you sleep.  ? How rested you feel the next day.  ? Any side effects of medicines you are taking.  ? What you eat and drink.  · Make your bedroom a dark, comfortable place where it is easy to fall asleep.  ? Put up shades or blackout curtains to block light from outside.  ? Use a white noise machine to block noise.  ? Keep the temperature cool.  · Limit screen use before bedtime. This includes:  ? Watching TV.  ? Using your smartphone, tablet, or computer.  · Stick to a routine that includes going to bed and waking up at the same times every day and night. This can help you fall asleep faster. Consider making a quiet activity, such as reading, part of your nighttime routine.  · Try to avoid taking naps during the day so that you sleep better at night.  · Get out of bed if you are still awake after 15 minutes of trying to sleep. Keep the lights down, but try reading or  doing a quiet activity. When you feel sleepy, go back to bed.  General instructions  · Take over-the-counter and prescription medicines only as told by your health care provider.  · Exercise regularly, as told by your health care provider. Avoid exercise starting several hours before bedtime.  · Use relaxation techniques to manage stress. Ask your health care provider to suggest some techniques that may work well for you. These may include:  ? Breathing exercises.  ? Routines to release muscle tension.  ? Visualizing peaceful scenes.  · Make sure that you drive carefully. Avoid driving if you feel very sleepy.  · Keep all follow-up visits as told by your health care provider. This is important.  Contact a health care provider if:  · You are tired throughout the day.  · You have trouble in your daily routine due to sleepiness.  · You continue to have sleep problems, or your sleep problems get worse.  Get help right away if:  · You have serious thoughts about hurting yourself or someone else.  If you ever feel like you may hurt yourself or others, or have thoughts about taking your own life, get help right away. You can go to your nearest emergency department or call:  · Your local emergency services (911 in the [place]  · A suicide crisis helpline, such as the National Suicide Prevention Lifeline at 1-973.798.5883. This is open 24 hours a day.  Summary  · Insomnia is a sleep disorder that makes it difficult to fall asleep or stay asleep.  · Insomnia can be long-term (chronic) or short-term (acute).  · Treatment for insomnia depends on the cause. Treatment may focus on treating an underlying condition that is causing insomnia.  · Keep a sleep diary to help you and your health care provider figure out what could be causing your insomnia.  This information is not intended to replace advice given to you by your health care provider. Make sure you discuss any questions you have with your health care provider.  Document  Released: 12/15/2001 Document Revised: 09/27/2018 Document Reviewed: 09/27/2018  [employer] Interactive Patient Education © 2020 [employer]

## 2020-05-21 ENCOUNTER — OFFICE VISIT (OUTPATIENT)
Dept: OBSTETRICS AND GYNECOLOGY | Facility: CLINIC | Age: 30
End: 2020-05-21

## 2020-05-21 VITALS
DIASTOLIC BLOOD PRESSURE: 84 MMHG | WEIGHT: 224.2 LBS | HEIGHT: 66 IN | BODY MASS INDEX: 36.03 KG/M2 | SYSTOLIC BLOOD PRESSURE: 117 MMHG

## 2020-05-21 DIAGNOSIS — N92.0 MENORRHAGIA WITH REGULAR CYCLE: ICD-10-CM

## 2020-05-21 DIAGNOSIS — N93.9 ABNORMAL UTERINE BLEEDING (AUB): Primary | ICD-10-CM

## 2020-05-21 PROCEDURE — 99213 OFFICE O/P EST LOW 20 MIN: CPT | Performed by: OBSTETRICS & GYNECOLOGY

## 2020-05-21 RX ORDER — SODIUM CHLORIDE 0.9 % (FLUSH) 0.9 %
10 SYRINGE (ML) INJECTION AS NEEDED
Status: CANCELLED | OUTPATIENT
Start: 2020-06-08

## 2020-05-21 RX ORDER — SODIUM CHLORIDE, SODIUM LACTATE, POTASSIUM CHLORIDE, CALCIUM CHLORIDE 600; 310; 30; 20 MG/100ML; MG/100ML; MG/100ML; MG/100ML
125 INJECTION, SOLUTION INTRAVENOUS CONTINUOUS
Status: CANCELLED | OUTPATIENT
Start: 2020-06-08

## 2020-05-21 RX ORDER — SODIUM CHLORIDE 0.9 % (FLUSH) 0.9 %
3 SYRINGE (ML) INJECTION EVERY 12 HOURS SCHEDULED
Status: CANCELLED | OUTPATIENT
Start: 2020-06-08

## 2020-05-21 NOTE — PROGRESS NOTES
Kecia Julien is a 30 y.o. y/o female.     Chief Complaint: Abnormal uterine bleeding    HPI:   30 y.o. .  Patient's last menstrual period was 2020..  Patient presents for follow-up on abnormal uterine bleeding.  Patient had previously been scheduled to undergo endometrial ablation however given the COVID pandemic this was put on hold.  Now that we are able to do surgery again patient would like to proceed with surgery.  States that she has not had any change in her symptoms is still having heavy bleeding and desires more definitive management.  Has been on Sprintec oral birth control pills without any relief of her bleeding at this time.    I discussed the risks of endometrial ablation. I discussed the risk of failure. I discussed the risks of serious complications including bowel, bladder, ureter and vaginal injury. I discussed the risk of uterine perforation.  I reviewed the risk of serious uterine adhesions with severe serious pelvic pain requiring a hysterectomy. I discussed the risk of bleeding with the patient and possible risk of blood transfusion.  Blood products carry risk of HIV, infection, hepatitis, and transfusion reaction. Patient is willing to accept blood products. She understands the risk of infection in the abdominal wall, pelvis, abdomen and other parts of the body. I reviewed the risk of hysterectomy for failure or for pain. I reviewed the method that I use is Madeleine and the strengths and weaknesses of it. Questions answered at length. Patient expressed understanding of the risks and desires to proceed with Novasure endometrial ablation.    Note from first visit:Patient presents for follow-up with me today for heavy periods.  Was seen by Liset last week for evaluation and was sent to me for counseling regarding endometrial ablation.  Patient states that she has had very heavy periods that last for several days to weeks at this time.  Has had a Mirena in the past which did not help  her periods at all, she is now looking for more definitive management of her bleeding as it is becoming life altering.  Patient has had 3 previous C-sections, I explained to her that given the C-sections she may be at slightly increased risk of damage during the ablation is that portion of the uterus has thinned and can lead to perforation.  Patient is going to get a pelvic ultrasound to see if there is any other further causes of her bleeding that may be able to be found.  After ultrasound is done we will review results and will need to do an endometrial biopsy based on these findings will discuss with patient if hysterectomy versus ablation may be a better option for her.  I spent 50% of this appointment discussing treatment options risks and benefits with this patient.     Review of Systems   Constitutional: Negative for chills, fatigue and fever.   HENT: Negative for sore throat.    Eyes: Negative for visual disturbance.   Respiratory: Negative for cough, shortness of breath and wheezing.    Cardiovascular: Negative for chest pain, palpitations and leg swelling.   Gastrointestinal: Negative for abdominal pain, diarrhea, nausea and vomiting.   Genitourinary: Positive for menstrual problem and vaginal bleeding. Negative for dysuria, flank pain, frequency, vaginal discharge and vaginal pain.   Neurological: Negative for syncope, light-headedness and headaches.   Psychiatric/Behavioral: Negative for dysphoric mood and suicidal ideas. The patient is not nervous/anxious.         The following portions of the patient's history were reviewed and updated as appropriate: allergies, current medications, past family history, past medical history, past social history, past surgical history and problem list.    Allergies   Allergen Reactions   • Lisinopril Cough        Prior to Admission medications    Medication Sig Start Date End Date Taking? Authorizing Provider   Acetaminophen (TYLENOL PO) Take 500 mg by mouth 2 (Two)  Times a Day.   Yes Provider, MD Sanjana   amitriptyline (ELAVIL) 25 MG tablet Take 1-2 tablets by mouth Every Night. 20  Yes Patricia Goss APRN   CBD (cannabidiol) oral oil Take 3 drops by mouth 2 (Two) Times a Day.   Yes Provider, MD Sanjana   norgestimate-ethinyl estradiol (ORTHO-CYCLEN) 0.25-35 MG-MCG per tablet Take 1 tablet by mouth Daily. 3/19/20  Yes Vincenzo Bowles, DO        The patient has a family history of   Family History   Problem Relation Age of Onset   • Hypertension Mother    • Diabetes Father    • Cancer Maternal Aunt    • Ovarian cancer Maternal Aunt    • Hypertension Maternal Aunt    • Atrial fibrillation Maternal Grandfather    • Hypertension Maternal Grandfather    • Cancer Paternal Grandmother    • Stroke Maternal Grandmother    • Stroke Maternal Aunt    • Hypertension Maternal Aunt    • Diabetes Paternal Grandfather         Past Medical History:   Diagnosis Date   • Hypertension    • Hypothyroid 2016   • Hypothyroidism    • Kidney stone 2016        OB History        4    Para   3    Term   3            AB   1    Living   3       SAB   1    TAB   0    Ectopic   0    Molar   0    Multiple   0    Live Births   3                 Social History     Socioeconomic History   • Marital status:      Spouse name: Not on file   • Number of children: Not on file   • Years of education: Not on file   • Highest education level: Not on file   Tobacco Use   • Smoking status: Former Smoker     Packs/day: 1.00     Years: 10.00     Pack years: 10.00     Types: Cigarettes     Start date: 2009     Last attempt to quit: 2019     Years since quittin.3   • Smokeless tobacco: Never Used   Substance and Sexual Activity   • Alcohol use: Not Currently     Frequency: Never   • Drug use: Not Currently     Types: Methamphetamines   • Sexual activity: Not Currently     Partners: Male     Birth control/protection: Surgical        Past Surgical History:  "  Procedure Laterality Date   •  SECTION     •  SECTION WITH TUBAL  2016   • CHOLECYSTECTOMY     • D&C WITH SUCTION  2015   • LAPAROSCOPIC CHOLECYSTECTOMY  2016   • TONSILLECTOMY     • TUBAL ABDOMINAL LIGATION          Patient Active Problem List   Diagnosis   • Cough due to ACE inhibitor   • Abnormal uterine bleeding (AUB)   • Menorrhagia with regular cycle        Documented Vitals    20 0856   BP: 117/84   Weight: 102 kg (224 lb 3.2 oz)   Height: 167.6 cm (66\")        Body mass index is 36.19 kg/m².    Physical Exam   Constitutional: She is oriented to person, place, and time. She appears well-developed and well-nourished. No distress.   HENT:   Head: Normocephalic.   Neck: No thyromegaly present.   Cardiovascular: Normal rate, regular rhythm, normal heart sounds and intact distal pulses.   No murmur heard.  Pulmonary/Chest: Effort normal and breath sounds normal. No respiratory distress. She has no wheezes.   Abdominal: Soft. Bowel sounds are normal. She exhibits no distension and no mass. There is no tenderness. There is no rebound and no guarding.   Musculoskeletal: Normal range of motion. She exhibits no edema, tenderness or deformity.   Neurological: She is alert and oriented to person, place, and time.   Skin: Skin is warm and dry. No erythema.   Psychiatric: She has a normal mood and affect. Her behavior is normal. Judgment and thought content normal.   Vitals reviewed.      Laboratory Data:   Lab Results - Last 18 Months   Lab Units 20  1512   GLUCOSE mg/dL 85   BUN mg/dL 12   CREATININE mg/dL 0.67   SODIUM mmol/L 137   POTASSIUM mmol/L 4.4   CHLORIDE mmol/L 98   CO2 mmol/L 26.4   CALCIUM mg/dL 10.0   TOTAL PROTEIN g/dL 7.4   ALBUMIN g/dL 4.50   ALT (SGPT) U/L 17   AST (SGOT) U/L 16   ALK PHOS U/L 57   BILIRUBIN mg/dL 0.2   EGFR IF NONAFRICN AM mL/min/1.73 104   GLOBULIN gm/dL 2.9   A/G RATIO g/dL 1.6   BUN / CREAT RATIO  17.9   ANION GAP mmol/L 12.6     Lab " Results - Last 18 Months   Lab Units 01/17/20  1512   WBC 10*3/mm3 8.70   RBC 10*6/mm3 4.94   HEMOGLOBIN g/dL 14.4   HEMATOCRIT % 41.1   MCV fL 83.2   MCH pg 29.1   MCHC g/dL 35.0   RDW % 12.5   RDW-SD fl 37.9   MPV fL 11.3   PLATELETS 10*3/mm3 260     No results for input(s): HCGQUAL in the last 43637 hours.     Tissue Pathology Exam: [id number]   Order: [id number]   Status:  Final result   Visible to patient:  Yes (MyChart)   Next appt:  05/28/2020 at 09:30 AM in Family Medicine ([facility])   Dx:  Abnormal uterine bleeding (AUB)   Specimen Information: Endometrium; Tissue        Component    Case Report   Surgical Pathology Report                         Case: [id number]                                   Authorizing Provider:  Vincenzo Bowles DO Collected:           03/19/2020 09:06 AM           Ordering Location:     [facility]     Received:            03/19/2020 03:26 PM                                  [facility]                                                                  Pathologist:           Jordon Alfaro MD                                                         Specimen:    Endometrium, EMB                                                                           Clinical Information    A: EMB   Final Diagnosis   ENDOMETRIAL CURETTINGS:  PROLIFERATIVE ENDOMETRIUM.   Electronically signed by Jordon Alfaro MD on 3/23/2020 at 1438   [facility]         Specimen Collected: 03/19/20 09:06 Last Resulted: 03/23/20 14:38 Order Details View Encounter Lab and Collection Details Routing Result History         Scans on Order [id number]     Document on 3/23/2020 1538 by Jordon Alfaro MD                 Assessment    Patient with abnormal uterine bleeding now desiring treatment via endometrial ablation.  Plan for patient undergo endometrial ablation on 8 June.  Patient return to see me approximately 1 week after for postop follow-up.  Preop set up patient  aware of testing that will need to be done.  Patient to return sooner as needed.     Diagnosis Plan   1. Abnormal uterine bleeding (AUB)  Case Request    sodium chloride 0.9 % flush 3 mL    sodium chloride 0.9 % flush 10 mL    lactated ringers infusion    CBC and Differential    hCG, Serum, QUALITATIVE    ABO / Rh    Case Request   2. Menorrhagia with regular cycle  Case Request    sodium chloride 0.9 % flush 3 mL    sodium chloride 0.9 % flush 10 mL    lactated ringers infusion    CBC and Differential    hCG, Serum, QUALITATIVE    ABO / Rh    Case Request         This document has been electronically signed by Vincenzo Bowles DO on May 21, 2020 11:30

## 2020-05-26 RX ORDER — AMITRIPTYLINE HYDROCHLORIDE 10 MG/1
TABLET, FILM COATED ORAL
Qty: 60 TABLET | Refills: 2 | OUTPATIENT
Start: 2020-05-26

## 2020-05-28 ENCOUNTER — OFFICE VISIT (OUTPATIENT)
Dept: FAMILY MEDICINE CLINIC | Facility: CLINIC | Age: 30
End: 2020-05-28

## 2020-05-28 VITALS
BODY MASS INDEX: 36.16 KG/M2 | DIASTOLIC BLOOD PRESSURE: 70 MMHG | OXYGEN SATURATION: 95 % | TEMPERATURE: 97.4 F | SYSTOLIC BLOOD PRESSURE: 120 MMHG | RESPIRATION RATE: 20 BRPM | HEART RATE: 86 BPM | HEIGHT: 66 IN | WEIGHT: 225 LBS

## 2020-05-28 DIAGNOSIS — F51.01 PRIMARY INSOMNIA: ICD-10-CM

## 2020-05-28 DIAGNOSIS — E66.9 OBESITY (BMI 30-39.9): ICD-10-CM

## 2020-05-28 DIAGNOSIS — G56.03 BILATERAL CARPAL TUNNEL SYNDROME: Primary | ICD-10-CM

## 2020-05-28 PROCEDURE — 99213 OFFICE O/P EST LOW 20 MIN: CPT | Performed by: NURSE PRACTITIONER

## 2020-05-28 RX ORDER — AMITRIPTYLINE HYDROCHLORIDE 25 MG/1
25-50 TABLET, FILM COATED ORAL NIGHTLY
Qty: 60 TABLET | Refills: 5 | Status: SHIPPED | OUTPATIENT
Start: 2020-05-28

## 2020-05-28 RX ORDER — NAPROXEN 500 MG/1
500 TABLET ORAL 2 TIMES DAILY WITH MEALS
Qty: 60 TABLET | Refills: 5 | Status: SHIPPED | OUTPATIENT
Start: 2020-05-28 | End: 2020-11-18

## 2020-06-05 PROCEDURE — U0003 INFECTIOUS AGENT DETECTION BY NUCLEIC ACID (DNA OR RNA); SEVERE ACUTE RESPIRATORY SYNDROME CORONAVIRUS 2 (SARS-COV-2) (CORONAVIRUS DISEASE [COVID-19]), AMPLIFIED PROBE TECHNIQUE, MAKING USE OF HIGH THROUGHPUT TECHNOLOGIES AS DESCRIBED BY CMS-2020-01-R: HCPCS | Performed by: OBSTETRICS & GYNECOLOGY

## 2020-06-06 LAB
COVID LABCORP PRIORITY: NORMAL
SARS-COV-2 RNA RESP QL NAA+PROBE: NOT DETECTED

## 2020-06-08 ENCOUNTER — HOSPITAL ENCOUNTER (OUTPATIENT)
Facility: HOSPITAL | Age: 30
Setting detail: HOSPITAL OUTPATIENT SURGERY
Discharge: HOME | End: 2020-06-08
Attending: OBSTETRICS & GYNECOLOGY | Admitting: OBSTETRICS & GYNECOLOGY

## 2020-06-08 ENCOUNTER — ANESTHESIA (OUTPATIENT)
Dept: PERIOP | Facility: HOSPITAL | Age: 30
End: 2020-06-08

## 2020-06-08 ENCOUNTER — ANESTHESIA EVENT (OUTPATIENT)
Dept: PERIOP | Facility: HOSPITAL | Age: 30
End: 2020-06-08

## 2020-06-08 VITALS
DIASTOLIC BLOOD PRESSURE: 60 MMHG | WEIGHT: 225.75 LBS | TEMPERATURE: 97.9 F | BODY MASS INDEX: 36.28 KG/M2 | OXYGEN SATURATION: 97 % | HEIGHT: 66 IN | SYSTOLIC BLOOD PRESSURE: 115 MMHG | HEART RATE: 71 BPM | RESPIRATION RATE: 20 BRPM

## 2020-06-08 DIAGNOSIS — N92.0 MENORRHAGIA WITH REGULAR CYCLE: ICD-10-CM

## 2020-06-08 DIAGNOSIS — N93.9 ABNORMAL UTERINE BLEEDING (AUB): ICD-10-CM

## 2020-06-08 LAB
ABO GROUP BLD: NORMAL
BLD GP AB SCN SERPL QL: NEGATIVE
HCG SERPL QL: NEGATIVE
Lab: NORMAL
RH BLD: NEGATIVE
T&S EXPIRATION DATE: NORMAL

## 2020-06-08 PROCEDURE — 93010 ELECTROCARDIOGRAM REPORT: CPT | Performed by: INTERNAL MEDICINE

## 2020-06-08 PROCEDURE — 86850 RBC ANTIBODY SCREEN: CPT | Performed by: OBSTETRICS & GYNECOLOGY

## 2020-06-08 PROCEDURE — 93005 ELECTROCARDIOGRAM TRACING: CPT | Performed by: ANESTHESIOLOGY

## 2020-06-08 PROCEDURE — 86900 BLOOD TYPING SEROLOGIC ABO: CPT | Performed by: OBSTETRICS & GYNECOLOGY

## 2020-06-08 PROCEDURE — 25010000002 DEXAMETHASONE PER 1 MG: Performed by: NURSE ANESTHETIST, CERTIFIED REGISTERED

## 2020-06-08 PROCEDURE — 84703 CHORIONIC GONADOTROPIN ASSAY: CPT | Performed by: OBSTETRICS & GYNECOLOGY

## 2020-06-08 PROCEDURE — 25010000002 FENTANYL CITRATE (PF) 100 MCG/2ML SOLUTION: Performed by: NURSE ANESTHETIST, CERTIFIED REGISTERED

## 2020-06-08 PROCEDURE — 25010000002 PROPOFOL 10 MG/ML EMULSION: Performed by: NURSE ANESTHETIST, CERTIFIED REGISTERED

## 2020-06-08 PROCEDURE — 25010000002 HYDROMORPHONE 1 MG/ML SOLUTION: Performed by: NURSE ANESTHETIST, CERTIFIED REGISTERED

## 2020-06-08 PROCEDURE — 58563 HYSTEROSCOPY ABLATION: CPT | Performed by: OBSTETRICS & GYNECOLOGY

## 2020-06-08 PROCEDURE — 25010000002 ONDANSETRON PER 1 MG: Performed by: NURSE ANESTHETIST, CERTIFIED REGISTERED

## 2020-06-08 PROCEDURE — 86901 BLOOD TYPING SEROLOGIC RH(D): CPT | Performed by: OBSTETRICS & GYNECOLOGY

## 2020-06-08 PROCEDURE — 25010000002 MIDAZOLAM PER 1 MG: Performed by: NURSE ANESTHETIST, CERTIFIED REGISTERED

## 2020-06-08 RX ORDER — SODIUM CHLORIDE 0.9 % (FLUSH) 0.9 %
10 SYRINGE (ML) INJECTION AS NEEDED
Status: DISCONTINUED | OUTPATIENT
Start: 2020-06-08 | End: 2020-06-08 | Stop reason: HOSPADM

## 2020-06-08 RX ORDER — PROMETHAZINE HYDROCHLORIDE 25 MG/1
25 SUPPOSITORY RECTAL ONCE AS NEEDED
Status: DISCONTINUED | OUTPATIENT
Start: 2020-06-08 | End: 2020-06-08 | Stop reason: HOSPADM

## 2020-06-08 RX ORDER — PROPOFOL 10 MG/ML
VIAL (ML) INTRAVENOUS AS NEEDED
Status: DISCONTINUED | OUTPATIENT
Start: 2020-06-08 | End: 2020-06-08 | Stop reason: SURG

## 2020-06-08 RX ORDER — ONDANSETRON 2 MG/ML
INJECTION INTRAMUSCULAR; INTRAVENOUS AS NEEDED
Status: DISCONTINUED | OUTPATIENT
Start: 2020-06-08 | End: 2020-06-08 | Stop reason: SURG

## 2020-06-08 RX ORDER — FLUMAZENIL 0.1 MG/ML
0.2 INJECTION INTRAVENOUS AS NEEDED
Status: DISCONTINUED | OUTPATIENT
Start: 2020-06-08 | End: 2020-06-08 | Stop reason: HOSPADM

## 2020-06-08 RX ORDER — OXYCODONE AND ACETAMINOPHEN 7.5; 325 MG/1; MG/1
1 TABLET ORAL ONCE AS NEEDED
Status: DISCONTINUED | OUTPATIENT
Start: 2020-06-08 | End: 2020-06-08 | Stop reason: HOSPADM

## 2020-06-08 RX ORDER — PROMETHAZINE HYDROCHLORIDE 25 MG/ML
12.5 INJECTION, SOLUTION INTRAMUSCULAR; INTRAVENOUS ONCE AS NEEDED
Status: DISCONTINUED | OUTPATIENT
Start: 2020-06-08 | End: 2020-06-08 | Stop reason: HOSPADM

## 2020-06-08 RX ORDER — DIPHENHYDRAMINE HYDROCHLORIDE 50 MG/ML
12.5 INJECTION INTRAMUSCULAR; INTRAVENOUS
Status: DISCONTINUED | OUTPATIENT
Start: 2020-06-08 | End: 2020-06-08 | Stop reason: HOSPADM

## 2020-06-08 RX ORDER — HYDROCODONE BITARTRATE AND ACETAMINOPHEN 5; 325 MG/1; MG/1
1-2 TABLET ORAL EVERY 4 HOURS PRN
Qty: 10 TABLET | Refills: 0 | Status: SHIPPED | OUTPATIENT
Start: 2020-06-08 | End: 2020-06-18

## 2020-06-08 RX ORDER — SODIUM CHLORIDE 0.9 % (FLUSH) 0.9 %
3 SYRINGE (ML) INJECTION EVERY 12 HOURS SCHEDULED
Status: DISCONTINUED | OUTPATIENT
Start: 2020-06-08 | End: 2020-06-08 | Stop reason: HOSPADM

## 2020-06-08 RX ORDER — SODIUM CHLORIDE, SODIUM LACTATE, POTASSIUM CHLORIDE, CALCIUM CHLORIDE 600; 310; 30; 20 MG/100ML; MG/100ML; MG/100ML; MG/100ML
125 INJECTION, SOLUTION INTRAVENOUS CONTINUOUS
Status: DISCONTINUED | OUTPATIENT
Start: 2020-06-08 | End: 2020-06-08 | Stop reason: HOSPADM

## 2020-06-08 RX ORDER — ONDANSETRON 2 MG/ML
4 INJECTION INTRAMUSCULAR; INTRAVENOUS ONCE AS NEEDED
Status: DISCONTINUED | OUTPATIENT
Start: 2020-06-08 | End: 2020-06-08 | Stop reason: HOSPADM

## 2020-06-08 RX ORDER — FENTANYL CITRATE 50 UG/ML
INJECTION, SOLUTION INTRAMUSCULAR; INTRAVENOUS AS NEEDED
Status: DISCONTINUED | OUTPATIENT
Start: 2020-06-08 | End: 2020-06-08 | Stop reason: SURG

## 2020-06-08 RX ORDER — ACETAMINOPHEN 325 MG/1
650 TABLET ORAL ONCE AS NEEDED
Status: DISCONTINUED | OUTPATIENT
Start: 2020-06-08 | End: 2020-06-08 | Stop reason: HOSPADM

## 2020-06-08 RX ORDER — MIDAZOLAM HYDROCHLORIDE 1 MG/ML
INJECTION INTRAMUSCULAR; INTRAVENOUS AS NEEDED
Status: DISCONTINUED | OUTPATIENT
Start: 2020-06-08 | End: 2020-06-08 | Stop reason: SURG

## 2020-06-08 RX ORDER — DEXAMETHASONE SODIUM PHOSPHATE 4 MG/ML
INJECTION, SOLUTION INTRA-ARTICULAR; INTRALESIONAL; INTRAMUSCULAR; INTRAVENOUS; SOFT TISSUE AS NEEDED
Status: DISCONTINUED | OUTPATIENT
Start: 2020-06-08 | End: 2020-06-08 | Stop reason: SURG

## 2020-06-08 RX ORDER — EPHEDRINE SULFATE 50 MG/ML
5 INJECTION, SOLUTION INTRAVENOUS ONCE AS NEEDED
Status: DISCONTINUED | OUTPATIENT
Start: 2020-06-08 | End: 2020-06-08 | Stop reason: HOSPADM

## 2020-06-08 RX ORDER — LABETALOL HYDROCHLORIDE 5 MG/ML
5 INJECTION, SOLUTION INTRAVENOUS
Status: DISCONTINUED | OUTPATIENT
Start: 2020-06-08 | End: 2020-06-08 | Stop reason: HOSPADM

## 2020-06-08 RX ORDER — NALOXONE HCL 0.4 MG/ML
0.4 VIAL (ML) INJECTION AS NEEDED
Status: DISCONTINUED | OUTPATIENT
Start: 2020-06-08 | End: 2020-06-08 | Stop reason: HOSPADM

## 2020-06-08 RX ORDER — PROMETHAZINE HYDROCHLORIDE 25 MG/1
25 TABLET ORAL ONCE AS NEEDED
Status: DISCONTINUED | OUTPATIENT
Start: 2020-06-08 | End: 2020-06-08 | Stop reason: HOSPADM

## 2020-06-08 RX ORDER — ACETAMINOPHEN 650 MG/1
650 SUPPOSITORY RECTAL ONCE AS NEEDED
Status: DISCONTINUED | OUTPATIENT
Start: 2020-06-08 | End: 2020-06-08 | Stop reason: HOSPADM

## 2020-06-08 RX ORDER — LIDOCAINE HYDROCHLORIDE 20 MG/ML
INJECTION, SOLUTION INFILTRATION; PERINEURAL AS NEEDED
Status: DISCONTINUED | OUTPATIENT
Start: 2020-06-08 | End: 2020-06-08 | Stop reason: SURG

## 2020-06-08 RX ADMIN — HYDROMORPHONE HYDROCHLORIDE 0.5 MG: 1 INJECTION, SOLUTION INTRAMUSCULAR; INTRAVENOUS; SUBCUTANEOUS at 08:20

## 2020-06-08 RX ADMIN — DEXAMETHASONE SODIUM PHOSPHATE 4 MG: 4 INJECTION, SOLUTION INTRAMUSCULAR; INTRAVENOUS at 07:22

## 2020-06-08 RX ADMIN — LIDOCAINE HYDROCHLORIDE 60 MG: 20 INJECTION, SOLUTION INFILTRATION; PERINEURAL at 07:16

## 2020-06-08 RX ADMIN — SODIUM CHLORIDE, POTASSIUM CHLORIDE, SODIUM LACTATE AND CALCIUM CHLORIDE 125 ML/HR: 600; 310; 30; 20 INJECTION, SOLUTION INTRAVENOUS at 06:12

## 2020-06-08 RX ADMIN — ONDANSETRON 4 MG: 2 INJECTION INTRAMUSCULAR; INTRAVENOUS at 07:44

## 2020-06-08 RX ADMIN — FENTANYL CITRATE 100 MCG: 50 INJECTION, SOLUTION INTRAMUSCULAR; INTRAVENOUS at 07:12

## 2020-06-08 RX ADMIN — PROPOFOL 150 MG: 10 INJECTION, EMULSION INTRAVENOUS at 07:16

## 2020-06-08 RX ADMIN — MIDAZOLAM HYDROCHLORIDE 2 MG: 2 INJECTION, SOLUTION INTRAMUSCULAR; INTRAVENOUS at 07:12

## 2020-06-08 NOTE — ANESTHESIA POSTPROCEDURE EVALUATION
Patient: Kecia Julien    Procedure Summary     Date:  06/08/20 Room / Location:  [facility] OR [address] [place] [address] OR    Anesthesia Start:  0712 Anesthesia Stop:  0759    Procedure:  DILATATION AND CURETTAGE HYSTEROSCOPY WITH EARLENE ENDOMETRIAL ABLATION (N/A Vagina) Diagnosis:       Abnormal uterine bleeding (AUB)      Menorrhagia with regular cycle      (Abnormal uterine bleeding (AUB) [N93.9])      (Menorrhagia with regular cycle [N92.0])    Surgeon:  Vincenzo Bowles DO Provider:  Tito Alicea MD    Anesthesia Type:  general ASA Status:  3          Anesthesia Type: general    Vitals  No vitals data found for the desired time range.          Post Anesthesia Care and Evaluation    Patient location during evaluation: PACU  Patient participation: complete - patient participated  Level of consciousness: awake and alert  Pain score: 0  Pain management: adequate  Airway patency: patent  Anesthetic complications: No anesthetic complications  PONV Status: none  Cardiovascular status: acceptable  Respiratory status: acceptable, spontaneous ventilation and unassisted (LMA remain in place, to be pulled by PACU RN upon pt awakening)  Hydration status: acceptable

## 2020-06-08 NOTE — OP NOTE
OPERATIVE NOTE  Kecia Julien  1990 6/8/2020    PREOP DIAGNOSES:  Abnormal uterine bleeding (AUB) [N93.9]  Menorrhagia with regular cycle [N92.0]    POSTOP DIAGNOSES:  Post-Op Diagnosis Codes:     * Abnormal uterine bleeding (AUB) [N93.9]     * Menorrhagia with regular cycle [N92.0]    Procedure(s):  HYSTEROSCOPY WITH MADELEINE ENDOMETRIAL ABLATION    SURGEON: Vincenzo Bowles DO, FACOG    ASSISTANT: Janneth Kay CSA      STAFF:   Circulator: Renay Reina RN  Scrub Person: Stephanie Loo Technician: Ramonita Andrade CST  Assistant: Janneth Kay CSA    ANESTHESIA: Choice    ANESTHESIA STAFF:  Anesthesiologist: Tito Alicea MD  CRNA: Lupillo Lindo CRNA    ESTIMATED BLOOD LOSS: 5 ml     SPECIMEN: None    FINDINGS: Normal-appearing vagina and cervix.  Normal-appearing uterine cavity with normal-appearing ostia bilaterally.  Cervix approximately 4 cm.  Uterine cavity 6 cm.    COMPLICATIONS: None    DESCRIPTION OF OPERATION:      After obtaining informed consent the patient was taken to the operating room where she underwent general LMA anesthesia.  She was placed in the low lithotomy position, and the perineum and vagina were prepped and draped in sterile fashion.     A bivalved speculum was placed, a single tooth tenaculum was placed on the anterior cervix, the uterus was sounded to 10cm. Using the uterus was sounded to 10cm, cervical canal 4cm, uterine cavity 6cm.  Hysteroscopy was performed with findings as noted above   The Madeleine device was then inserted into the uterine cavity. Once deployed,  the device was activated. The procedure terminated after 120 seconds without complications.    The device was removed, followed by the tenaculum, silver nitrate was applied over the cervix, hemostasis was noted. The speculum was then removed.  The patient was transferred to the recovery room in good condition.  Sponge, lap, needle, instrument counts correct x 2       This document  has been electronically signed by Vincenzo Bowles DO on June 8, 2020 07:58

## 2020-06-08 NOTE — ANESTHESIA PREPROCEDURE EVALUATION
Anesthesia Evaluation     no history of anesthetic complications:  NPO Solid Status: > 8 hours  NPO Liquid Status: > 8 hours           Airway   Mallampati: II  TM distance: >3 FB  Neck ROM: full  No difficulty expected  Dental - normal exam     Pulmonary - negative pulmonary ROS and normal exam    breath sounds clear to auscultation  (-) COPD, asthma, sleep apnea, not a smoker  Cardiovascular - normal exam  Exercise tolerance: good (4-7 METS)    ECG reviewed  Rhythm: regular  Rate: normal    (+) hypertension (off meds) well controlled,   (-) valvular problems/murmurs, dysrhythmias, angina, cardiac stents, DVT, hyperlipidemia    ROS comment: Normal sinus rhythm with sinus arrhythmia  Normal ECG  No previous ECGs available    Neuro/Psych  (+) psychiatric history Anxiety and Depression,     (-) seizures, TIA, CVA, headaches  GI/Hepatic/Renal/Endo    (+) obesity,  GERD well controlled,  renal disease (hx of stones) stones, thyroid problem (currenlty off meds)   (-) hepatitis, liver disease, diabetes    Musculoskeletal (-) negative ROS    Abdominal    Substance History   (-) alcohol use, drug use     OB/GYN    (-)  Pregnant    Comment: menorrhagia      Other        (-) history of cancer                  Anesthesia Plan    ASA 3     general     intravenous induction     Anesthetic plan, all risks, benefits, and alternatives have been provided, discussed and informed consent has been obtained with: patient and mother.

## 2020-06-08 NOTE — ANESTHESIA PROCEDURE NOTES
Airway  Urgency: elective    Date/Time: 6/8/2020 7:17 AM  Airway not difficult    General Information and Staff    Patient location during procedure: OR  CRNA: Lupillo Lindo CRNA    Indications and Patient Condition  Indications for airway management: airway protection    Preoxygenated: yes  MILS maintained throughout      Final Airway Details  Final airway type: supraglottic airway      Successful airway: I-gel  Size 4    Number of attempts at approach: 1

## 2020-06-09 ENCOUNTER — TELEPHONE (OUTPATIENT)
Dept: OBSTETRICS AND GYNECOLOGY | Facility: CLINIC | Age: 30
End: 2020-06-09

## 2020-06-09 NOTE — TELEPHONE ENCOUNTER
PATIENT IS WANTING TO GET A WORK EXCUSE. SHE HAD A PROCEDURE WITH DR CHEN ON Monday. AND STILL A LITTLE SORE. SHE WANTS TO RETURN TO WORK ON Thursday..PT WILL  THE EXCUSE IN [place] TOMORROW.

## 2020-06-18 ENCOUNTER — OFFICE VISIT (OUTPATIENT)
Dept: OBSTETRICS AND GYNECOLOGY | Facility: CLINIC | Age: 30
End: 2020-06-18

## 2020-06-18 VITALS
WEIGHT: 227 LBS | DIASTOLIC BLOOD PRESSURE: 88 MMHG | BODY MASS INDEX: 36.48 KG/M2 | HEIGHT: 66 IN | SYSTOLIC BLOOD PRESSURE: 136 MMHG

## 2020-06-18 DIAGNOSIS — Z98.890 S/P ENDOMETRIAL ABLATION: ICD-10-CM

## 2020-06-18 DIAGNOSIS — Z09 POSTOPERATIVE FOLLOW-UP: Primary | ICD-10-CM

## 2020-06-18 PROCEDURE — 99213 OFFICE O/P EST LOW 20 MIN: CPT | Performed by: OBSTETRICS & GYNECOLOGY

## 2020-06-18 NOTE — PROGRESS NOTES
Kecia Julien is a 30 y.o. y/o female.     Chief Complaint: Postoperative follow-up    HPI:   30 y.o. .  No LMP recorded. Patient has had an ablation..  Patient is status post ablation approximately 10 days ago.  Ambulating without difficulty, tolerating regular diet.  Normal bowel and bladder habits.  Pain is well controlled very minimal bleeding is having a watery discharge which is normal after surgery.  Patient is without complaints.     Review of Systems   Constitutional: Negative for chills, fatigue and fever.   HENT: Negative for sore throat.    Eyes: Negative for visual disturbance.   Respiratory: Negative for cough, shortness of breath and wheezing.    Cardiovascular: Negative for chest pain, palpitations and leg swelling.   Gastrointestinal: Negative for abdominal pain, diarrhea, nausea and vomiting.   Genitourinary: Positive for vaginal discharge. Negative for dysuria, flank pain, frequency, vaginal bleeding and vaginal pain.   Neurological: Negative for syncope, light-headedness and headaches.   Psychiatric/Behavioral: Negative for dysphoric mood and suicidal ideas. The patient is not nervous/anxious.         The following portions of the patient's history were reviewed and updated as appropriate: allergies, current medications, past family history, past medical history, past social history, past surgical history and problem list.    Allergies   Allergen Reactions   • Lisinopril Cough        Prior to Admission medications    Medication Sig Start Date End Date Taking? Authorizing Provider   Acetaminophen (TYLENOL PO) Take 500 mg by mouth 2 (Two) Times a Day.   Yes Sanjana Carl MD   amitriptyline (ELAVIL) 25 MG tablet Take 1-2 tablets by mouth Every Night. 20  Yes Patricia Goss APRN   CBD (cannabidiol) oral oil Take 3 drops by mouth 2 (Two) Times a Day.   Yes Sanjana Carl MD   naproxen (Naprosyn) 500 MG tablet Take 1 tablet by mouth 2 (Two) Times a Day With Meals.  20  Yes Patricia Goss APRN   HYDROcodone-acetaminophen (NORCO) 5-325 MG per tablet Take 1 to 2 tablets by mouth Every 4 (Four) Hours As Needed for Pain. 20  Vincenzo Bowles,         The patient has a family history of   Family History   Problem Relation Age of Onset   • Hypertension Mother    • Diabetes Father    • Cancer Maternal Aunt    • Ovarian cancer Maternal Aunt    • Hypertension Maternal Aunt    • Atrial fibrillation Maternal Grandfather    • Hypertension Maternal Grandfather    • Cancer Paternal Grandmother    • Stroke Maternal Grandmother    • Stroke Maternal Aunt    • Hypertension Maternal Aunt    • Diabetes Paternal Grandfather         Past Medical History:   Diagnosis Date   • Disease of thyroid gland     has been diagnosed with hypo.  states last labs was WNL.  MD took her off meds   • Hypertension     primarily during pregnancy   • Kidney stone 2016        OB History        4    Para   3    Term   3            AB   1    Living   3       SAB   1    TAB   0    Ectopic   0    Molar   0    Multiple   0    Live Births   3                 Social History     Socioeconomic History   • Marital status:      Spouse name: Not on file   • Number of children: Not on file   • Years of education: Not on file   • Highest education level: Not on file   Tobacco Use   • Smoking status: Former Smoker     Packs/day: 1.00     Years: 10.00     Pack years: 10.00     Types: Cigarettes     Start date: 2009     Last attempt to quit: 2019     Years since quittin.4   • Smokeless tobacco: Never Used   Substance and Sexual Activity   • Alcohol use: Not Currently     Frequency: Never   • Drug use: Not Currently     Types: Methamphetamines   • Sexual activity: Defer        Past Surgical History:   Procedure Laterality Date   •  SECTION     •  SECTION     •  SECTION WITH TUBAL  2016   • D&C HYSTEROSCOPY ENDOMETRIAL ABLATION N/A 2020     "Procedure: DILATATION AND CURETTAGE HYSTEROSCOPY WITH EARLENE ENDOMETRIAL ABLATION;  Surgeon: Vincenzo Bowles DO;  Location: [facility];  Service: Obstetrics;  Laterality: N/A;   • D&C WITH SUCTION  03/01/2015   • LAPAROSCOPIC CHOLECYSTECTOMY  08/22/2016   • TONSILLECTOMY     • TUBAL ABDOMINAL LIGATION          Patient Active Problem List   Diagnosis   • Cough due to ACE inhibitor   • Abnormal uterine bleeding (AUB)   • Menorrhagia with regular cycle        Documented Vitals    06/18/20 0839   BP: 136/88   Weight: 103 kg (227 lb)   Height: 167.6 cm (66\")        Body mass index is 36.64 kg/m².    Physical Exam   Constitutional: She is oriented to person, place, and time. She appears well-developed and well-nourished. No distress.   HENT:   Head: Normocephalic.   Abdominal: Soft. She exhibits no distension. There is no tenderness. There is no rebound and no guarding.   Musculoskeletal: Normal range of motion.   Neurological: She is alert and oriented to person, place, and time.   Skin: Skin is dry. She is not diaphoretic.   Psychiatric: She has a normal mood and affect. Her behavior is normal. Judgment and thought content normal.       Laboratory Data:   Lab Results - Last 18 Months   Lab Units 01/17/20  1512   GLUCOSE mg/dL 85   BUN mg/dL 12   CREATININE mg/dL 0.67   SODIUM mmol/L 137   POTASSIUM mmol/L 4.4   CHLORIDE mmol/L 98   CO2 mmol/L 26.4   CALCIUM mg/dL 10.0   TOTAL PROTEIN g/dL 7.4   ALBUMIN g/dL 4.50   ALT (SGPT) U/L 17   AST (SGOT) U/L 16   ALK PHOS U/L 57   BILIRUBIN mg/dL 0.2   EGFR IF NONAFRICN AM mL/min/1.73 104   GLOBULIN gm/dL 2.9   A/G RATIO g/dL 1.6   BUN / CREAT RATIO  17.9   ANION GAP mmol/L 12.6     Lab Results - Last 18 Months   Lab Units 01/17/20  1512   WBC 10*3/mm3 8.70   RBC 10*6/mm3 4.94   HEMOGLOBIN g/dL 14.4   HEMATOCRIT % 41.1   MCV fL 83.2   MCH pg 29.1   MCHC g/dL 35.0   RDW % 12.5   RDW-SD fl 37.9   MPV fL 11.3   PLATELETS 10*3/mm3 260     Lab Results - Last 18 Months   Lab " Units 06/08/20  0615   HCG QUALITATIVE  Negative       Assessment   Doing well and recovering appropriately at this time.  Patient to return to see me as needed.  Continued pelvic rest for 2-3 additional weeks.  May return to other activities without restrictions     Diagnosis Plan   1. Postoperative follow-up     2. S/P endometrial ablation           This document has been electronically signed by Vincenzo Bowles DO on June 18, 2020 08:54

## 2020-06-20 PROBLEM — G56.03 BILATERAL CARPAL TUNNEL SYNDROME: Status: ACTIVE | Noted: 2020-06-20

## 2020-06-20 PROBLEM — E66.9 OBESITY (BMI 30-39.9): Status: ACTIVE | Noted: 2020-06-20

## 2020-06-20 PROBLEM — F51.01 PRIMARY INSOMNIA: Status: ACTIVE | Noted: 2020-06-20

## 2020-06-21 NOTE — PROGRESS NOTES
Subjective   Kecia Julien is a 30 y.o. female.     She presents today for her routine follow-up on chronic medical problems.  Her blood pressure is well controlled today in the office.  She reports that she developed a cough after starting the lisinopril.  She has discontinued this.  She reports that she has been checking her blood pressure regularly and this has been normal for the most part.  She reports that the Elavil has helped with her anxiety and stress symptoms.  She is sleeping well.  She reports no difficulty getting up in the morning.  She has been taking some melatonin as well to help with her insomnia..  She is currently working at [employer].  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  She does have complaints of pain and tingling in her bilateral wrists.  She is otherwise without any other new complaints.    Hypertension   This is a chronic problem. The current episode started more than 1 year ago. The problem has been waxing and waning since onset. The problem is controlled. Pertinent negatives include no anxiety, blurred vision, chest pain, headaches, neck pain, orthopnea, palpitations, peripheral edema, PND, shortness of breath or sweats. There are no associated agents to hypertension. Risk factors for coronary artery disease include family history, obesity and stress. Current antihypertension treatment includes nothing. The current treatment provides moderate improvement. Compliance problems include diet and exercise.  Identifiable causes of hypertension include a thyroid problem.   Insomnia   This is a new problem. The current episode started more than 1 month ago. The problem occurs intermittently. The problem has been gradually worsening. Associated symptoms include arthralgias, joint swelling and myalgias. Pertinent negatives include no abdominal pain, anorexia, change in bowel habit, chest pain, chills, congestion, coughing, diaphoresis, fever, headaches, nausea, neck pain,  numbness, rash, sore throat, swollen glands, urinary symptoms, vertigo, visual change, vomiting or weakness. The treatment provided no relief.        The following portions of the patient's history were reviewed and updated as appropriate: allergies, current medications, past family history, past medical history, past social history, past surgical history and problem list.    Review of Systems   Constitutional: Negative.  Negative for chills, diaphoresis and fever.   HENT: Negative.  Negative for congestion, sore throat and swollen glands.    Eyes: Negative.  Negative for blurred vision.   Respiratory: Negative.  Negative for cough and shortness of breath.    Cardiovascular: Negative.  Negative for chest pain, palpitations, orthopnea and PND.   Gastrointestinal: Negative.  Negative for abdominal pain, anorexia, change in bowel habit, nausea and vomiting.   Musculoskeletal: Positive for arthralgias, joint swelling and myalgias. Negative for neck pain.   Skin: Negative.  Negative for rash.   Allergic/Immunologic: Negative.    Neurological: Negative.  Negative for vertigo, weakness and numbness.   Hematological: Negative.    Psychiatric/Behavioral: Positive for sleep disturbance. The patient has insomnia.        Objective   Physical Exam   Constitutional: She is oriented to person, place, and time. Vital signs are normal. She appears well-developed and well-nourished. No distress. She is obese.  HENT:   Head: Normocephalic.   Right Ear: External ear normal.   Left Ear: External ear normal.   Nose: Nose normal.   Mouth/Throat: Oropharynx is clear and moist. No oropharyngeal exudate.   Eyes: Pupils are equal, round, and reactive to light. Conjunctivae and EOM are normal. Right eye exhibits no discharge. Left eye exhibits no discharge.   Neck: Normal range of motion. Neck supple. No tracheal deviation present. No thyromegaly present.   Cardiovascular: Normal rate, regular rhythm and normal heart sounds. Exam reveals no  gallop and no friction rub.   No murmur heard.  Pulmonary/Chest: Effort normal and breath sounds normal. No respiratory distress. She has no wheezes. She has no rales. She exhibits no tenderness.   Musculoskeletal:        Right wrist: She exhibits decreased range of motion and tenderness.        Left wrist: She exhibits decreased range of motion and tenderness.   Lymphadenopathy:     She has no cervical adenopathy.   Neurological: She is alert and oriented to person, place, and time.   Skin: Skin is warm and dry. Capillary refill takes less than 2 seconds. No rash noted. She is not diaphoretic. No erythema. No pallor.   Psychiatric: She has a normal mood and affect. Her behavior is normal. Judgment and thought content normal.   Nursing note and vitals reviewed.        Assessment/Plan   Kecia was seen today for follow-up and insomnia.    Diagnoses and all orders for this visit:    Bilateral carpal tunnel syndrome  -     naproxen (Naprosyn) 500 MG tablet; Take 1 tablet by mouth 2 (Two) Times a Day With Meals.    Primary insomnia  -     amitriptyline (ELAVIL) 25 MG tablet; Take 1-2 tablets by mouth Every Night.    Obesity (BMI 30-39.9)               Patient's Body mass index is 36.32 kg/m². BMI is above normal parameters. Recommendations include: educational material.    Naproxen twice daily as needed for carpal tunnel symptoms.  Continue all other current medications.  Follow up in 3 months for routine follow up.  Follow up sooner for problems/concerns.  Patient verbalized understanding and agreement with plan of care.        This document has been electronically signed by LAWANDA Pierce on June 20, 2020 20:20

## 2020-06-29 RX ORDER — AMITRIPTYLINE HYDROCHLORIDE 10 MG/1
TABLET, FILM COATED ORAL
Qty: 60 TABLET | Refills: 2 | OUTPATIENT
Start: 2020-06-29

## 2020-07-22 RX ORDER — AMITRIPTYLINE HYDROCHLORIDE 10 MG/1
TABLET, FILM COATED ORAL
Qty: 60 TABLET | Refills: 2 | OUTPATIENT
Start: 2020-07-22

## 2020-08-24 RX ORDER — AMITRIPTYLINE HYDROCHLORIDE 10 MG/1
TABLET, FILM COATED ORAL
Qty: 60 TABLET | Refills: 0 | OUTPATIENT
Start: 2020-08-24

## 2020-11-18 DIAGNOSIS — G56.03 BILATERAL CARPAL TUNNEL SYNDROME: ICD-10-CM

## 2020-11-18 RX ORDER — NAPROXEN 500 MG/1
TABLET ORAL
Qty: 60 TABLET | Refills: 2 | Status: SHIPPED | OUTPATIENT
Start: 2020-11-18

## (undated) DEVICE — STRAP STIRUP SLP RNG 19X3.5IN DISP

## (undated) DEVICE — PK D AND C 60

## (undated) DEVICE — STERILE POLYISOPRENE POWDER-FREE SURGICAL GLOVES WITH EMOLLIENT COATING: Brand: PROTEXIS

## (undated) DEVICE — DRSNG TELFA PAD NONADH STR 1S 3X8IN

## (undated) DEVICE — GLV SURG TRIUMPH LT PF LTX 7.5 STRL

## (undated) DEVICE — GAUZE,SPONGE,4"X4",16PLY,XRAY,STRL,LF: Brand: MEDLINE

## (undated) DEVICE — GLV SURG SENSICARE PI PF LF 7 GRN STRL

## (undated) DEVICE — CP SLF SEAL HYSTERSCOPE 2MM/HL

## (undated) DEVICE — GOWN,PREVENTION PLUS,XLNG/XXLARGE,STRL: Brand: MEDLINE

## (undated) DEVICE — CYSTO/BLADDER IRRIGATION SET, REGULATING CLAMP

## (undated) DEVICE — SOL IRR NACL 0.9PCT 1000ML

## (undated) DEVICE — DRP SURG U/BUTT W/PCH BACK STRL

## (undated) DEVICE — UNDRPD BREATH 23X36 BG/10

## (undated) DEVICE — TUBING, SUCTION, 3/16" X 6', STRAIGHT: Brand: MEDLINE

## (undated) DEVICE — GLV SURG NEOLON 2G PF LF 6.5 STRL

## (undated) DEVICE — STERILE MINERVA DISPOSABLE HANDPIECECONTENTS:(1) ONE SINGLE USE STERILE MINERVA ES DISPOSABLE HANDPIECE (1) ONE SINGLE USE STERILE SYRINGE(1) ONE SINGLE USE STERILE 8MM HEGAR DILATOR(1) ONE SINGLE USE NON-STERILE DESICCANT(1) ONE NON-STERILE HANDPIECE INSTRUCTIONS FOR USE(1)  ONE NON-STERILE DILATOR INSTRUCTIONS FOR USE: Brand: MINERVA SINGLE STERILE DISPOSABLE HANDPIECE